# Patient Record
Sex: FEMALE | Race: WHITE | NOT HISPANIC OR LATINO | Employment: OTHER | URBAN - METROPOLITAN AREA
[De-identification: names, ages, dates, MRNs, and addresses within clinical notes are randomized per-mention and may not be internally consistent; named-entity substitution may affect disease eponyms.]

---

## 2017-06-29 RX ORDER — LISINOPRIL 10 MG/1
10 TABLET ORAL AT BEDTIME
COMMUNITY
End: 2024-06-19

## 2017-06-29 RX ORDER — SOLIFENACIN SUCCINATE 10 MG/1
5-10 TABLET, FILM COATED ORAL DAILY PRN
Status: ON HOLD | COMMUNITY
End: 2024-06-24

## 2017-06-29 RX ORDER — METFORMIN HCL 500 MG
500 TABLET, EXTENDED RELEASE 24 HR ORAL 2 TIMES DAILY
COMMUNITY
End: 2024-06-19

## 2017-06-30 ENCOUNTER — APPOINTMENT (OUTPATIENT)
Dept: GENERAL RADIOLOGY | Facility: CLINIC | Age: 77
End: 2017-06-30
Attending: UROLOGY
Payer: MEDICARE

## 2017-06-30 ENCOUNTER — ANESTHESIA EVENT (OUTPATIENT)
Dept: SURGERY | Facility: CLINIC | Age: 77
End: 2017-06-30
Payer: MEDICARE

## 2017-06-30 ENCOUNTER — ANESTHESIA (OUTPATIENT)
Dept: SURGERY | Facility: CLINIC | Age: 77
End: 2017-06-30
Payer: MEDICARE

## 2017-06-30 ENCOUNTER — HOSPITAL ENCOUNTER (OUTPATIENT)
Facility: CLINIC | Age: 77
Discharge: HOME | End: 2017-06-30
Attending: UROLOGY | Admitting: UROLOGY
Payer: MEDICARE

## 2017-06-30 VITALS
TEMPERATURE: 96.8 F | BODY MASS INDEX: 23.76 KG/M2 | DIASTOLIC BLOOD PRESSURE: 59 MMHG | SYSTOLIC BLOOD PRESSURE: 138 MMHG | HEIGHT: 64 IN | RESPIRATION RATE: 16 BRPM | WEIGHT: 139.2 LBS | OXYGEN SATURATION: 97 %

## 2017-06-30 DIAGNOSIS — N32.81 OVERACTIVE BLADDER: Primary | ICD-10-CM

## 2017-06-30 LAB — GLUCOSE BLDC GLUCOMTR-MCNC: 92 MG/DL (ref 70–99)

## 2017-06-30 PROCEDURE — 40000065 ZZH STATISTIC EKG NON-CHARGEABLE

## 2017-06-30 PROCEDURE — 25000125 ZZHC RX 250: Performed by: NURSE ANESTHETIST, CERTIFIED REGISTERED

## 2017-06-30 PROCEDURE — 37000009 ZZH ANESTHESIA TECHNICAL FEE, EACH ADDTL 15 MIN: Performed by: UROLOGY

## 2017-06-30 PROCEDURE — 25000128 H RX IP 250 OP 636: Performed by: UROLOGY

## 2017-06-30 PROCEDURE — 40000170 ZZH STATISTIC PRE-PROCEDURE ASSESSMENT II: Performed by: UROLOGY

## 2017-06-30 PROCEDURE — 37000008 ZZH ANESTHESIA TECHNICAL FEE, 1ST 30 MIN: Performed by: UROLOGY

## 2017-06-30 PROCEDURE — 25000128 H RX IP 250 OP 636: Performed by: ANESTHESIOLOGY

## 2017-06-30 PROCEDURE — 25000125 ZZHC RX 250: Performed by: UROLOGY

## 2017-06-30 PROCEDURE — 27210995 ZZH RX 272: Performed by: UROLOGY

## 2017-06-30 PROCEDURE — C1787 PATIENT PROGR, NEUROSTIM: HCPCS | Performed by: UROLOGY

## 2017-06-30 PROCEDURE — 71000027 ZZH RECOVERY PHASE 2 EACH 15 MINS: Performed by: UROLOGY

## 2017-06-30 PROCEDURE — 36000063 ZZH SURGERY LEVEL 4 EA 15 ADDTL MIN: Performed by: UROLOGY

## 2017-06-30 PROCEDURE — 25000128 H RX IP 250 OP 636: Performed by: NURSE ANESTHETIST, CERTIFIED REGISTERED

## 2017-06-30 PROCEDURE — 25000132 ZZH RX MED GY IP 250 OP 250 PS 637: Mod: GY | Performed by: ANESTHESIOLOGY

## 2017-06-30 PROCEDURE — 82962 GLUCOSE BLOOD TEST: CPT

## 2017-06-30 PROCEDURE — 93010 ELECTROCARDIOGRAM REPORT: CPT | Performed by: INTERNAL MEDICINE

## 2017-06-30 PROCEDURE — C1883 ADAPT/EXT, PACING/NEURO LEAD: HCPCS | Performed by: UROLOGY

## 2017-06-30 PROCEDURE — 36000065 ZZH SURGERY LEVEL 4 W FLUORO 1ST 30 MIN: Performed by: UROLOGY

## 2017-06-30 PROCEDURE — A9270 NON-COVERED ITEM OR SERVICE: HCPCS | Mod: GY | Performed by: ANESTHESIOLOGY

## 2017-06-30 PROCEDURE — 93005 ELECTROCARDIOGRAM TRACING: CPT

## 2017-06-30 PROCEDURE — C1778 LEAD, NEUROSTIMULATOR: HCPCS | Performed by: UROLOGY

## 2017-06-30 PROCEDURE — C1767 GENERATOR, NEURO NON-RECHARG: HCPCS | Performed by: UROLOGY

## 2017-06-30 PROCEDURE — 40000277 XR SURGERY CARM FLUORO LESS THAN 5 MIN W STILLS

## 2017-06-30 PROCEDURE — 71000012 ZZH RECOVERY PHASE 1 LEVEL 1 FIRST HR: Performed by: UROLOGY

## 2017-06-30 PROCEDURE — 27210794 ZZH OR GENERAL SUPPLY STERILE: Performed by: UROLOGY

## 2017-06-30 PROCEDURE — S0020 INJECTION, BUPIVICAINE HYDRO: HCPCS | Performed by: UROLOGY

## 2017-06-30 DEVICE — STIMULATOR NEURO INTER-STIM II 3058: Type: IMPLANTABLE DEVICE | Site: BACK | Status: FUNCTIONAL

## 2017-06-30 DEVICE — LEAD INTERSTIM KIT 3889-28: Type: IMPLANTABLE DEVICE | Site: BACK | Status: FUNCTIONAL

## 2017-06-30 RX ORDER — ONDANSETRON 2 MG/ML
4 INJECTION INTRAMUSCULAR; INTRAVENOUS EVERY 30 MIN PRN
Status: DISCONTINUED | OUTPATIENT
Start: 2017-06-30 | End: 2017-06-30 | Stop reason: HOSPADM

## 2017-06-30 RX ORDER — BUPIVACAINE HYDROCHLORIDE 2.5 MG/ML
INJECTION, SOLUTION INFILTRATION; PERINEURAL PRN
Status: DISCONTINUED | OUTPATIENT
Start: 2017-06-30 | End: 2017-06-30 | Stop reason: HOSPADM

## 2017-06-30 RX ORDER — CEFAZOLIN SODIUM 2 G/100ML
2 INJECTION, SOLUTION INTRAVENOUS
Status: COMPLETED | OUTPATIENT
Start: 2017-06-30 | End: 2017-06-30

## 2017-06-30 RX ORDER — FENTANYL CITRATE 50 UG/ML
25-50 INJECTION, SOLUTION INTRAMUSCULAR; INTRAVENOUS
Status: COMPLETED | OUTPATIENT
Start: 2017-06-30 | End: 2017-06-30

## 2017-06-30 RX ORDER — ONDANSETRON 2 MG/ML
INJECTION INTRAMUSCULAR; INTRAVENOUS PRN
Status: DISCONTINUED | OUTPATIENT
Start: 2017-06-30 | End: 2017-06-30

## 2017-06-30 RX ORDER — MEPERIDINE HYDROCHLORIDE 25 MG/ML
12.5 INJECTION INTRAMUSCULAR; INTRAVENOUS; SUBCUTANEOUS
Status: DISCONTINUED | OUTPATIENT
Start: 2017-06-30 | End: 2017-06-30 | Stop reason: HOSPADM

## 2017-06-30 RX ORDER — OXYCODONE AND ACETAMINOPHEN 5; 325 MG/1; MG/1
1 TABLET ORAL EVERY 4 HOURS PRN
Qty: 25 TABLET | Refills: 0 | Status: SHIPPED | OUTPATIENT
Start: 2017-06-30 | End: 2017-06-30

## 2017-06-30 RX ORDER — CEPHALEXIN 500 MG/1
500 CAPSULE ORAL 3 TIMES DAILY
Qty: 12 CAPSULE | Refills: 0 | Status: SHIPPED | OUTPATIENT
Start: 2017-06-30 | End: 2017-07-04

## 2017-06-30 RX ORDER — EPHEDRINE SULFATE 50 MG/ML
INJECTION, SOLUTION INTRAMUSCULAR; INTRAVENOUS; SUBCUTANEOUS PRN
Status: DISCONTINUED | OUTPATIENT
Start: 2017-06-30 | End: 2017-06-30

## 2017-06-30 RX ORDER — NALOXONE HYDROCHLORIDE 0.4 MG/ML
.1-.4 INJECTION, SOLUTION INTRAMUSCULAR; INTRAVENOUS; SUBCUTANEOUS
Status: DISCONTINUED | OUTPATIENT
Start: 2017-06-30 | End: 2017-06-30 | Stop reason: HOSPADM

## 2017-06-30 RX ORDER — ACETAMINOPHEN 325 MG/1
975 TABLET ORAL ONCE
Status: COMPLETED | OUTPATIENT
Start: 2017-06-30 | End: 2017-06-30

## 2017-06-30 RX ORDER — PROPOFOL 10 MG/ML
INJECTION, EMULSION INTRAVENOUS CONTINUOUS PRN
Status: DISCONTINUED | OUTPATIENT
Start: 2017-06-30 | End: 2017-06-30

## 2017-06-30 RX ORDER — ACETAMINOPHEN AND CODEINE PHOSPHATE 300; 30 MG/1; MG/1
1 TABLET ORAL EVERY 4 HOURS PRN
Qty: 25 TABLET | Refills: 0 | Status: ON HOLD | OUTPATIENT
Start: 2017-06-30 | End: 2019-07-22

## 2017-06-30 RX ORDER — DEXAMETHASONE SODIUM PHOSPHATE 4 MG/ML
INJECTION, SOLUTION INTRA-ARTICULAR; INTRALESIONAL; INTRAMUSCULAR; INTRAVENOUS; SOFT TISSUE PRN
Status: DISCONTINUED | OUTPATIENT
Start: 2017-06-30 | End: 2017-06-30

## 2017-06-30 RX ORDER — FENTANYL CITRATE 0.05 MG/ML
25-50 INJECTION, SOLUTION INTRAMUSCULAR; INTRAVENOUS
Status: DISCONTINUED | OUTPATIENT
Start: 2017-06-30 | End: 2017-06-30 | Stop reason: HOSPADM

## 2017-06-30 RX ORDER — ONDANSETRON 4 MG/1
4 TABLET, ORALLY DISINTEGRATING ORAL EVERY 30 MIN PRN
Status: DISCONTINUED | OUTPATIENT
Start: 2017-06-30 | End: 2017-06-30 | Stop reason: HOSPADM

## 2017-06-30 RX ORDER — LIDOCAINE HYDROCHLORIDE 20 MG/ML
INJECTION, SOLUTION INFILTRATION; PERINEURAL PRN
Status: DISCONTINUED | OUTPATIENT
Start: 2017-06-30 | End: 2017-06-30

## 2017-06-30 RX ORDER — SODIUM CHLORIDE, SODIUM LACTATE, POTASSIUM CHLORIDE, CALCIUM CHLORIDE 600; 310; 30; 20 MG/100ML; MG/100ML; MG/100ML; MG/100ML
INJECTION, SOLUTION INTRAVENOUS CONTINUOUS
Status: DISCONTINUED | OUTPATIENT
Start: 2017-06-30 | End: 2017-06-30 | Stop reason: HOSPADM

## 2017-06-30 RX ADMIN — SODIUM CHLORIDE, POTASSIUM CHLORIDE, SODIUM LACTATE AND CALCIUM CHLORIDE: 600; 310; 30; 20 INJECTION, SOLUTION INTRAVENOUS at 15:54

## 2017-06-30 RX ADMIN — FENTANYL CITRATE 25 MCG: 50 INJECTION, SOLUTION INTRAMUSCULAR; INTRAVENOUS at 16:19

## 2017-06-30 RX ADMIN — ACETAMINOPHEN 975 MG: 325 TABLET, FILM COATED ORAL at 14:05

## 2017-06-30 RX ADMIN — FENTANYL CITRATE 25 MCG: 50 INJECTION, SOLUTION INTRAMUSCULAR; INTRAVENOUS at 16:30

## 2017-06-30 RX ADMIN — MIDAZOLAM HYDROCHLORIDE 2 MG: 1 INJECTION, SOLUTION INTRAMUSCULAR; INTRAVENOUS at 15:54

## 2017-06-30 RX ADMIN — LIDOCAINE HYDROCHLORIDE 40 MG: 20 INJECTION, SOLUTION INFILTRATION; PERINEURAL at 15:54

## 2017-06-30 RX ADMIN — Medication 10 MG: at 16:15

## 2017-06-30 RX ADMIN — FENTANYL CITRATE 25 MCG: 50 INJECTION, SOLUTION INTRAMUSCULAR; INTRAVENOUS at 15:54

## 2017-06-30 RX ADMIN — DEXAMETHASONE SODIUM PHOSPHATE 4 MG: 4 INJECTION, SOLUTION INTRA-ARTICULAR; INTRALESIONAL; INTRAMUSCULAR; INTRAVENOUS; SOFT TISSUE at 16:00

## 2017-06-30 RX ADMIN — ONDANSETRON 4 MG: 2 INJECTION INTRAMUSCULAR; INTRAVENOUS at 16:00

## 2017-06-30 RX ADMIN — PROPOFOL 100 MCG/KG/MIN: 10 INJECTION, EMULSION INTRAVENOUS at 15:54

## 2017-06-30 RX ADMIN — CEFAZOLIN SODIUM 2 G: 2 INJECTION, SOLUTION INTRAVENOUS at 16:05

## 2017-06-30 RX ADMIN — Medication 5 MG: at 16:31

## 2017-06-30 RX ADMIN — Medication 5 MG: at 16:25

## 2017-06-30 RX ADMIN — FENTANYL CITRATE 25 MCG: 50 INJECTION, SOLUTION INTRAMUSCULAR; INTRAVENOUS at 16:48

## 2017-06-30 NOTE — ANESTHESIA CARE TRANSFER NOTE
Patient: Sabrina Burrell    Procedure(s):  INTERSTIM STAGE ONE AND TWO PLACEMENT - Wound Class: I-Clean    Diagnosis: HYPERACTIVITY OF BLADDER   Diagnosis Additional Information: No value filed.    Anesthesia Type:   General, LMA     Note:  Airway :Room Air  Patient transferred to:PACU  Comments: VS upon arrival to PACU  135/66, 86, 12, 99% on room air. Report to AMY Jaffe.      Vitals: (Last set prior to Anesthesia Care Transfer)    CRNA VITALS  6/30/2017 1626 - 6/30/2017 1700      6/30/2017             Resp Rate (set): 10                Electronically Signed By: RANULFO Chavis CRNA  June 30, 2017  5:00 PM

## 2017-06-30 NOTE — ANESTHESIA PREPROCEDURE EVALUATION
Anesthesia Evaluation     . Pt has had prior anesthetic.     History of anesthetic complications   - PONV        ROS/MED HX    ENT/Pulmonary:      (-) sleep apnea   Neurologic:       Cardiovascular:     (+) hypertension----. : . . . :. .       METS/Exercise Tolerance:  >4 METS   Hematologic:         Musculoskeletal:         GI/Hepatic:        (-) GERD   Renal/Genitourinary:         Endo:     (+) type I DM, Not using insulin Diabetic complications: neuropathy, thyroid problem hypothyroidism, .      Psychiatric:         Infectious Disease:         Malignancy:         Other:                     Physical Exam  Normal systems: dental    Airway   Mallampati: II  TM distance: >3 FB  Neck ROM: full    Dental     Cardiovascular   Rhythm and rate: regular and normal      Pulmonary    breath sounds clear to auscultation                    Anesthesia Plan      History & Physical Review  History and physical reviewed and following examination; no interval change.    ASA Status:  2 .    NPO Status:  > 8 hours    Plan for MAC with Intravenous induction.   PONV prophylaxis:  Ondansetron (or other 5HT-3)  Propofol drip  Po tylenol in preop  ga backup      Postoperative Care  Postoperative pain management:  IV analgesics.      Consents  Anesthetic plan, risks, benefits and alternatives discussed with:  Patient..                          .

## 2017-06-30 NOTE — IP AVS SNAPSHOT
[place] [address]    [place] [address]    Phone:  [phone]                                       After Visit Summary   6/30/2017    Sabrina Burrell    MRN: [id number]           After Visit Summary Signature Page     I have received my discharge instructions, and my questions have been answered. I have discussed any challenges I see with this plan with the nurse or doctor.    ..........................................................................................................................................  Patient/Patient Representative Signature      ..........................................................................................................................................  Patient Representative Print Name and Relationship to Patient    ..................................................               ................................................  Date                                            Time    ..........................................................................................................................................  Reviewed by Signature/Title    ...................................................              ..............................................  Date                                                            Time

## 2017-06-30 NOTE — BRIEF OP NOTE
[facility] Brief Operative Note    Pre-operative diagnosis: HYPERACTIVITY OF BLADDER    Post-operative diagnosis Same    Procedure: Procedure(s):  INTERSTIM STAGE ONE AND TWO PLACEMENT - Wound Class: I-Clean   Surgeon(s): Surgeon(s) and Role:     * Barrie Oro MD - Primary   Estimated blood loss: 1 mL    Specimens: * No specimens in log *   Findings: Right S3 ad right pocket

## 2017-06-30 NOTE — IP AVS SNAPSHOT
MRN:[id number]                      After Visit Summary   6/30/2017    Sabrina Burrell    MRN: [id number]           Thank you!     Thank you for choosing [facility] for your care. Our goal is always to provide you with excellent care. Hearing back from our patients is one way we can continue to improve our services. Please take a few minutes to complete the written survey that you may receive in the mail after you visit with us. Thank you!        Patient Information     Date Of Birth          1940        About your hospital stay     You were admitted on:  June 30, 2017 You last received care in the:  [facility] PACU    You were discharged on:  June 30, 2017        Reason for your hospital stay       Overactive bladder                  Who to Call     For medical emergencies, please call 911.  For non-urgent questions about your medical care, please call your primary care provider or clinic, [phone]  For questions related to your surgery, please call your surgery clinic        Attending Provider     Provider Barrie Eaton MD Urology       Primary Care Provider Office Phone # Fax #    Aline Austin [phone]      After Care Instructions     Patient care order       Re-start asa in 5 days                  Follow-up Appointments     Follow-up and recommended labs and tests        F/u with dR. Oro in one month and with juan in 2 weeks call [phone] to schedule                  Further instructions from your care team         Same Day Surgery Discharge Instructions for  Sedation and General Anesthesia       It's not unusual to feel dizzy, light-headed or faint for up to 24 hours after surgery or while taking pain medication.  If you have these symptoms: sit for a few minutes before standing and have someone assist you when you get up to walk or use the bathroom.      You should rest and relax for the next 24 hours. We recommend you make  arrangements to have an adult stay with you for at least 24 hours after your discharge.  Avoid hazardous and strenuous activity.      DO NOT DRIVE any vehicle or operate mechanical equipment for 24 hours following the end of your surgery.  Even though you may feel normal, your reactions may be affected by the medication you have received.      Do not drink alcoholic beverages for 24 hours following surgery.       Slowly progress to your regular diet as you feel able. It's not unusual to feel nauseated and/or vomit after receiving anesthesia.  If you develop these symptoms, drink clear liquids (apple juice, ginger ale, broth, 7-up, etc. ) until you feel better.  If your nausea and vomiting persists for 24 hours, please notify your surgeon.        All narcotic pain medications, along with inactivity and anesthesia, can cause constipation. Drinking plenty of liquids and increasing fiber intake will help.      For any questions of a medical nature, call your surgeon.      Do not make important decisions for 24 hours.       If you feel your pain is not well managed with the pain medications prescribed by your surgeon, please contact your surgeon's office to let them know so they can address your concerns.       While you were at the hospital today you were given 975 mg of Tylenol at 2pm. The recommended daily maximum dose is 4000 mg.     InterStim Discharge Instruction        General information:      Your incision may be sore and tender for 2 weeks.     You may shower starting tomorrow     Your incision was closed using a liquid adhesive. It's important not to scratch, rub or pick the film.  Also, do not apply lotions or creams to the area.    Activity:      Reduce normal daily activity for the first week. No excessive bending, twisting or bouncing.     Walking and light exercise is permissible.    No lifting, pulling or pushing objects heavier than 10 lbs. No vacuuming, carrying groceries or laundry baskets for 3-4  "weeks.     No strenuous exercise for 3-4 weeks    No sexual intercourse for 1-2 weeks.        Contact your surgeon for the following:      A temperature above 101 F.     Pain not relieved by medication    Rash    Nausea or vomiting    Swelling, excessive drainage, warmth at the incisional site      Follow up care:      The [employer] representative will instruct you about your InterStim device. Please contact them with any questions.  Your first follow up appointment will be with a nurse in your surgeon's office in 1-2 weeks.  You will then see your surgeon for a follow up in one month.      **If you have questions or concerns about your procedure,   call Dr. Oro at [phone]**      Pending Results     Date and Time Order Name Status Description    2017 1641 XR Surgery ODILIA Fluoro L/T 5 Min w Stills In process     2017 1308 EKG 12-lead, tracing only Preliminary             Admission Information     Date & Time Provider Department Dept. Phone    2017 Barrie Oro MD [facility] PACU [phone]      Your Vitals Were     Blood Pressure Temperature Respirations Height Weight Pulse Oximetry    139/53 96.8  F (36  C) 15 1.626 m (5' 4\") 63.1 kg (139 lb 3.2 oz) 92%    BMI (Body Mass Index)                   23.89 kg/m2           MyChart Information     [facility] lets you send messages to your doctor, view your test results, renew your prescriptions, schedule appointments and more. To sign up, go to [email or web address] . Click on \"Log in\" on the left side of the screen, which will take you to the Welcome page. Then click on \"Sign up Now\" on the right side of the page.     You will be asked to enter the access code listed below, as well as some personal information. Please follow the directions to create your username and password.     Your access code is: [email or web address]  Expires: 2017  5:05 PM     Your access code will  in 90 days. If you need help or a new code, please " call your [facility] clinic or [phone].        Care EveryWhere ID     This is your Care EveryWhere ID. This could be used by other organizations to access your [facility] medical records  [id number]        Equal Access to Services     GERARD JAEGER : Hadii aad ku hadcollinsaditi Rhonda, waaxda luqadaha, qaybta kaalmada adegama, danny villanuevafrandy alexandermita pascualgeri bautista. So [facility] [phone].    ATENCIÓN: Si habla español, tiene a bose disposición servicios gratuitos de asistencia lingüística. Llame al [phone].    We comply with applicable federal civil rights laws and [place] laws. We do not discriminate on the basis of race, color, national origin, age, disability sex, sexual orientation or gender identity.               Review of your medicines      START taking        Dose / Directions    acetaminophen-codeine 300-30 MG per tablet   Commonly known as:  TYLENOL w/CODEINE No. 3   Used for:  Overactive bladder        Dose:  1 tablet   Take 1 tablet by mouth every 4 hours as needed for mild pain   Quantity:  25 tablet   Refills:  0       cephALEXin 500 MG capsule   Commonly known as:  KEFLEX   Used for:  Overactive bladder        Dose:  500 mg   Take 1 capsule (500 mg) by mouth 3 times daily for 12 doses   Quantity:  12 capsule   Refills:  0         CONTINUE these medicines which have NOT CHANGED        Dose / Directions    ALLOPURINOL PO        Dose:  300 mg   Take 300 mg by mouth   Refills:  0       ASPIRIN PO        Dose:  81 mg   Take 81 mg by mouth daily   Refills:  0       cetirizine 10 MG tablet   Commonly known as:  zyrTEC        Dose:  10 mg   Take 10 mg by mouth daily   Refills:  0       Estradiol-Norethindrone Acet 0.5-0.1 MG Tabs        Refills:  0       levothyroxine 100 MCG tablet   Commonly known as:  SYNTHROID/LEVOTHROID        Take by mouth daily   Refills:  0       metFORMIN 500 MG 24 hr tablet   Commonly known as:  GLUCOPHAGE-XR        Dose:  500 mg   Take 500 mg by mouth daily (with dinner)  TAKES 2000 MG BID   Refills:  0       MULTIVITAMINS PO        Refills:  0       OMEGA-3 FISH OIL PO        Refills:  0       oxybutynin chloride 15 MG Tb24        Dose:  15 mg   Take 15 mg by mouth daily   Refills:  0       PROBIOTIC & ACIDOPHILUS EX ST PO        Refills:  0       triamcinolone 0.1 % cream   Commonly known as:  KENALOG        Apply topically 2 times daily   Refills:  0       VESICARE PO        Dose:  10 mg   Take 10 mg by mouth   Refills:  0       VITAMIN D3 PO        Dose:  2000 Units   Take 2,000 Units by mouth daily   Refills:  0       ZESTRIL PO        Dose:  10 mg   Take 10 mg by mouth daily   Refills:  0       ZOCOR PO        Dose:  10 mg   Take 10 mg by mouth   Refills:  0            Where to get your medicines      These medications were sent to [facility] - [address]  [address], [place] [address]     Phone:  [phone]     cephALEXin 500 MG capsule         Some of these will need a paper prescription and others can be bought over the counter. Ask your nurse if you have questions.     Bring a paper prescription for each of these medications     acetaminophen-codeine 300-30 MG per tablet                Protect others around you: Learn how to safely use, store and throw away your medicines at www.disposemymeds.org.             Medication List: This is a list of all your medications and when to take them. Check marks below indicate your daily home schedule. Keep this list as a reference.      Medications           Morning Afternoon Evening Bedtime As Needed    acetaminophen-codeine 300-30 MG per tablet   Commonly known as:  TYLENOL w/CODEINE No. 3   Take 1 tablet by mouth every 4 hours as needed for mild pain                                ALLOPURINOL PO   Take 300 mg by mouth                                ASPIRIN PO   Take 81 mg by mouth daily                                cephALEXin 500 MG capsule   Commonly known as:  KEFLEX   Take 1  capsule (500 mg) by mouth 3 times daily for 12 doses                                cetirizine 10 MG tablet   Commonly known as:  zyrTEC   Take 10 mg by mouth daily                                Estradiol-Norethindrone Acet 0.5-0.1 MG Tabs                                levothyroxine 100 MCG tablet   Commonly known as:  SYNTHROID/LEVOTHROID   Take by mouth daily                                metFORMIN 500 MG 24 hr tablet   Commonly known as:  GLUCOPHAGE-XR   Take 500 mg by mouth daily (with dinner) TAKES 2000 MG BID                                MULTIVITAMINS PO                                OMEGA-3 FISH OIL PO                                oxybutynin chloride 15 MG Tb24   Take 15 mg by mouth daily                                PROBIOTIC & ACIDOPHILUS EX ST PO                                triamcinolone 0.1 % cream   Commonly known as:  KENALOG   Apply topically 2 times daily                                VESICARE PO   Take 10 mg by mouth                                VITAMIN D3 PO   Take 2,000 Units by mouth daily                                ZESTRIL PO   Take 10 mg by mouth daily                                ZOCOR PO   Take 10 mg by mouth

## 2017-06-30 NOTE — DISCHARGE INSTRUCTIONS
Same Day Surgery Discharge Instructions for  Sedation and General Anesthesia       It's not unusual to feel dizzy, light-headed or faint for up to 24 hours after surgery or while taking pain medication.  If you have these symptoms: sit for a few minutes before standing and have someone assist you when you get up to walk or use the bathroom.      You should rest and relax for the next 24 hours. We recommend you make arrangements to have an adult stay with you for at least 24 hours after your discharge.  Avoid hazardous and strenuous activity.      DO NOT DRIVE any vehicle or operate mechanical equipment for 24 hours following the end of your surgery.  Even though you may feel normal, your reactions may be affected by the medication you have received.      Do not drink alcoholic beverages for 24 hours following surgery.       Slowly progress to your regular diet as you feel able. It's not unusual to feel nauseated and/or vomit after receiving anesthesia.  If you develop these symptoms, drink clear liquids (apple juice, ginger ale, broth, 7-up, etc. ) until you feel better.  If your nausea and vomiting persists for 24 hours, please notify your surgeon.        All narcotic pain medications, along with inactivity and anesthesia, can cause constipation. Drinking plenty of liquids and increasing fiber intake will help.      For any questions of a medical nature, call your surgeon.      Do not make important decisions for 24 hours.       If you feel your pain is not well managed with the pain medications prescribed by your surgeon, please contact your surgeon's office to let them know so they can address your concerns.       While you were at the hospital today you were given 975 mg of Tylenol at 2pm. The recommended daily maximum dose is 4000 mg.     InterStim Discharge Instruction        General information:      Your incision may be sore and tender for 2 weeks.     You may shower starting tomorrow     Your incision was  closed using a liquid adhesive. It's important not to scratch, rub or pick the film.  Also, do not apply lotions or creams to the area.    Activity:      Reduce normal daily activity for the first week. No excessive bending, twisting or bouncing.     Walking and light exercise is permissible.    No lifting, pulling or pushing objects heavier than 10 lbs. No vacuuming, carrying groceries or laundry baskets for 3-4 weeks.     No strenuous exercise for 3-4 weeks    No sexual intercourse for 1-2 weeks.        Contact your surgeon for the following:      A temperature above 101 F.     Pain not relieved by medication    Rash    Nausea or vomiting    Swelling, excessive drainage, warmth at the incisional site      Follow up care:      The [employer] representative will instruct you about your InterStim device. Please contact them with any questions.  Your first follow up appointment will be with a nurse in your surgeon's office in 1-2 weeks.  You will then see your surgeon for a follow up in one month.      **If you have questions or concerns about your procedure,   call Dr. Oro at [phone]**

## 2017-07-01 NOTE — ANESTHESIA POSTPROCEDURE EVALUATION
Patient: Sabrina Burrell    Procedure(s):  INTERSTIM STAGE ONE AND TWO PLACEMENT - Wound Class: I-Clean    Diagnosis:HYPERACTIVITY OF BLADDER   Diagnosis Additional Information: No value filed.    Anesthesia Type:  General, LMA    Note:  Anesthesia Post Evaluation    Patient location during evaluation: PACU  Patient participation: Able to fully participate in evaluation  Level of consciousness: awake  Pain management: adequate  Cardiovascular status: acceptable  Respiratory status: acceptable  Hydration status: acceptable  PONV: none     Anesthetic complications: None          Last vitals:  Vitals:    06/30/17 1715 06/30/17 1730 06/30/17 1820   BP: 136/60 139/53 138/59   Resp: 18 15 16   Temp: 35.8  C (96.4  F) 36  C (96.8  F)    SpO2: 97% 92% 97%         Electronically Signed By: JADEN YI  June 30, 2017  9:05 PM

## 2017-07-04 LAB — INTERPRETATION ECG - MUSE: NORMAL

## 2017-07-07 NOTE — OP NOTE
Surgeon / Clinician: Barrie Oro MD    PREOPERATIVE DIAGNOSIS:  Overactive bladder.    POSTOPERATIVE DIAGNOSIS:  Overactive bladder.    PROCEDURE: InterStim stage I, stage II placement.    SURGEON:  Barrie Oro MD    ANESTHESIA:  Sedation and local.    Preoperatively she received antibiotics.    ESTIMATED BLOOD LOSS:  Minimal.    INDICATIONS FOR PROCEDURE:  Sabrina Burrell is a 76-year-old female who underwent percutaneous InterStim placement test in the office and she did quite well with the bother score improved by 90% with improved urgency and frequency.  I consented her for stage I and stage II placement with the risks of bleeding, infection into the surrounding organs, inability to have MRI, need to exchange battery every 3-5 years, persistence of the overactive bladder symptoms and she would like to proceed.    PROCEDURE:  Sabrina was brought to the operating room and placed in supine position after excellent induction of sedation.  Her back areas were prepped and draped in the regular fashion right S3 foramen was nicely cannulated using a percutaneous needle.  I achieved excellent motor responses both vellus and toe flexion.  Given excellent response with very small current, I decided to place permanent 4 electrodes which were introduced under fluoroscopic guidance and appeared in excellent position over right S3 foramen.  Once the leads with 4 electrodes were positioned, I connected subcutaneously to the right mid pocket over the buttock cheek area.  A 4 cm incision was made to make the pocket over the skin and subcutaneous tissue was dissected.  I connected the battery to the lead and placed the battery into the subcutaneous pocket.  The pocket was closed using 2-0 Vicryl in a running fashion.  Subcutaneous incision was closed using 2-0 Vicryl in the running fashion also .  All incisional sides were closed using Monocryl and Dermabond to the skin.  The patient       was wakened up and  transferred to the stretcher and recovery room in stable condition.      The plan as of now would be to see our nurse back in 1-2 week interval and see me back in 1 month interval.        Barrie Oro MD    D:  07/05/2017 17:23 T:  07/07/2017 01:38  Document:  [id number] LS\GM\mk

## 2017-11-16 ENCOUNTER — THERAPY VISIT (OUTPATIENT)
Dept: PHYSICAL THERAPY | Facility: CLINIC | Age: 77
End: 2017-11-16
Payer: MEDICARE

## 2017-11-16 DIAGNOSIS — M54.41 ACUTE RIGHT-SIDED LOW BACK PAIN WITH RIGHT-SIDED SCIATICA: Primary | ICD-10-CM

## 2017-11-16 PROCEDURE — 97112 NEUROMUSCULAR REEDUCATION: CPT | Mod: GP | Performed by: PHYSICAL THERAPIST

## 2017-11-16 PROCEDURE — G8982 BODY POS GOAL STATUS: HCPCS | Mod: GP | Performed by: PHYSICAL THERAPIST

## 2017-11-16 PROCEDURE — G8981 BODY POS CURRENT STATUS: HCPCS | Mod: GP | Performed by: PHYSICAL THERAPIST

## 2017-11-16 PROCEDURE — 97161 PT EVAL LOW COMPLEX 20 MIN: CPT | Mod: GP | Performed by: PHYSICAL THERAPIST

## 2017-11-16 PROCEDURE — 97110 THERAPEUTIC EXERCISES: CPT | Mod: GP | Performed by: PHYSICAL THERAPIST

## 2017-11-16 NOTE — PROGRESS NOTES
[facility] Initial Evaluation -- Lumbar    Date: November 16, 2017  Sabrina Burrell is a 76 year old female with a Back and Rt leg condition.   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     Symptoms (improving/unchanging/worsening):  improving.     Symptoms commenced as a result of: possibly d/t prolonged sitting at a dinner in a [place]   Condition occurred in the following environment:   Community??     Symptoms at onset (back/thigh/leg): Sharp pain post Rt leg and knee  Constant symptoms (back/thigh/leg): Post Rt thigh, post lat knee and calf  Intermittent symptoms (back/thigh/leg):     Symptoms are made worse with the following: Sometimes Bending, Always Sitting, Sometimes Rising, Always Standing, Always Lying, Time of day - Always as the day progresses and Always When still   Symptoms are made better with the following: Always On the move and Other - advil    Disturbed sleep (yes/no):  Difficulty falling asleep and was waking during the night Sleeping postures (prone/sup/side R/L): side R/L    Previous episodes (0/1-5/6-10/11+): 7 yr Hx of LBP Year of first episode:     Previous history: nagging aching in LB  Previous treatments: Chiro 1 x/week for LB - temp relief of leg pain      Specific Questions:  Cough/Sneeze/Strain (pos/neg): neg  Bowel/Bladder (normal/abnormal): normal  Gait (normal/abnormal): normal  Medications  (nil/NSAIDS/analg/steroids/anticoag/other):  NSAIDS and Other - Hormone replacement, High blood pressure and Diabetes and thyroid  Medical allergies:  NKA  General health (excellent/good/fair/poor):  good  Pertinent medical history:  Diabetes, Implanted device and Thyroid problems  Imaging (None/Xray/MRI/Other):  X-ray - Arthrtis  Recent or major surgery (yes/no):  Knee replacement Lt,  Night pain (yes/no): no  Accidents (yes/no): no  Unexplained weight loss (yes/no): no  Barriers at home: none  Other red flags: N&T, Pain at night rest    EXAMINATION    Posture:   Sitting (good/fair/poor): fair  Standing (good/fair/poor):fair  Lordosis (red/acc/normal): red  Correction of posture (better/worse/no effect): better    Lateral Shift (right/left/nil): Right  Relevant (yes/no):  ??? Did have some before pain started  Other Observations:     Neurological:    Motor deficit:  Mod Difficulty toe walk, min difficulty heel walk, unable to single leg squat Rt, Decr hip flexion  Reflexes:    Sensory deficit:    Dural signs:  (++) Slump    Movement Loss:   Jose Mod Min Nil Pain   Flexion  X   Rt leg pain   Extension  X      Side Gliding R   X  LBP   Side Gliding L   X       Test Movements:   During: produces, abolishes, increases, decreases, no effect, centralizing, peripheralizing   After: better, worse, no better, no worse, no effect, centralized, peripheralized    Pretest symptoms standing: pain Rt post leg/knee to calf   Symptoms During Symptoms After ROM increased ROM decreased No Effect   FIS        Rep FIS        EIS Decreases    No Better         Rep EIS Decreases    Better    X - incr strength         Static Tests:  Sitting slouched:  Incr  Sitting erect:  decr  Standing slouched na  Standing erect:  na  Lying prone in extension:  na Long sitting:  na    Other Tests:     Provisional Classification:  Derangement - Asymmetrical, unilateral, symptoms below knee    Principle of Management:  Education:  Posture:  Neutral spine,  use of L-roll, Affect of posture on LB and pain.Centralisation vs peripheralisation,    Equipment provided:  none  Mechanical therapy (Y/N):  y   Extension principle:  EIStdg hips against table or elbows on wall 10x 6 x/day.  Lateral Principle:    Flexion principle:    Other:      ASSESSMENT/PLAN:    Patient is a 76 year old female with lumbar complaints.    Patient has the following significant findings with corresponding treatment plan.                Diagnosis 1:  LBP w/ sciatica Rt  Pain -  manual therapy, education, directional preference exercise and home program  Decreased ROM/flexibility - manual therapy, therapeutic exercise and home program  Decreased strength - therapeutic exercise, therapeutic activities and home program  Decreased function - therapeutic activities and home program  Impaired posture - neuro re-education and home program    Therapy Evaluation Codes:   1) History comprised of:   Personal factors that impact the plan of care:      None.    Comorbidity factors that impact the plan of care are:      Diabetes and Implanted device.     Medications impacting care: Anti-inflammatory, High blood pressure and hormone replacement, Thyroid.  2) Examination of Body Systems comprised of:   Body structures and functions that impact the plan of care:      Lumbar spine.   Activity limitations that impact the plan of care are:      Bending, Dressing, Sitting and Standing.  3) Clinical presentation characteristics are:   Stable/Uncomplicated.  4) Decision-Making    Low complexity using standardized patient assessment instrument and/or measureable assessment of functional outcome.  Cumulative Therapy Evaluation is: Low complexity.    Previous and current functional limitations:  (See Goal Flow Sheet for this information)    Short term and Long term goals: (See Goal Flow Sheet for this information)     Communication ability:  Patient appears to be able to clearly communicate and understand verbal and written  communication and follow directions correctly.  Treatment Explanation - The following has been discussed with the patient:   RX ordered/plan of care  Anticipated outcomes  Possible risks and side effects  This patient would benefit from PT intervention to resume normal activities.   Rehab potential is good.    Frequency:  1 X week, once daily  Duration:  for 8 weeks  Discharge Plan:  Achieve all LTG.  Independent in home treatment program.  Reach maximal therapeutic benefit.    Please refer to the daily flowsheet for treatment today, total treatment time and time spent performing 1:1 timed codes.

## 2017-11-16 NOTE — LETTER
DEPARTMENT OF HEALTH AND HUMAN SERVICES  CENTERS FOR MEDICARE & MEDICAID SERVICES    PLAN/UPDATED PLAN OF PROGRESS FOR OUTPATIENT REHABILITATION    PATIENTS NAME:  Sabrina Burrell   : 1940    PROVIDER NUMBER:    [id number]  [facility]  [id number]     PROVIDER NAME: [facility]  MEDICAL RECORD NUMBER: [id number]     START OF CARE DATE:  SOC Date: 17   TYPE:  PT    PRIMARY/TREATMENT DIAGNOSIS: (Pertinent Medical Diagnosis)  Acute right-sided low back pain with right-sided sciatica    VISITS FROM START OF CARE:  1     [facility] Initial Evaluation -- Lumbar  Date: 2017  Sabrina Burrell is a 76 year old female with a Back and Rt leg condition.   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     Symptoms (improving/unchanging/worsening):  improving.   Symptoms commenced as a result of: possibly d/t prolonged sitting at a dinner in a [place]   Condition occurred in the following environment:   Community??   Symptoms at onset (back/thigh/leg): Sharp pain post Rt leg and knee  Constant symptoms (back/thigh/leg): Post Rt thigh, post lat knee and calf  Intermittent symptoms (back/thigh/leg):   Symptoms are made worse with the following: Sometimes Bending, Always Sitting, Sometimes Rising, Always Standing, Always Lying, Time of day - Always as the day progresses and Always When still   Symptoms  are made better with the following: Always On the move and Other - advil  Disturbed sleep (yes/no):  Difficulty falling asleep and was waking during the night  Sleeping postures (prone/sup/side R/L): side R/L  Previous episodes (0/1-5/6-10/11+): 7 yr Hx of LBP   Year of first episode:   Previous history: nagging aching in LB  Previous treatments: Chiro 1 x/week for LB - temp relief of leg pain  PATIENTS NAME:  Sabrina Burrell   : 1940  PRIMARY/TREATMENT DIAGNOSIS: (Pertinent Medical Diagnosis)  Acute right-sided low back pain with right-sided sciatica    Specific Questions:  Cough/Sneeze/Strain (pos/neg): neg  Bowel/Bladder (normal/abnormal): normal  Gait (normal/abnormal): normal  Medications (nil/NSAIDS/analg/steroids/anticoag/other):  NSAIDS and Other - Hormone replacement, High blood pressure and Diabetes and thyroid  Medical allergies:  NKA  General health (excellent/good/fair/poor):  good  Pertinent medical history:  Diabetes, Implanted device and Thyroid problems  Imaging (None/Xray/MRI/Other):  X-ray - Arthrtis  Recent or major surgery (yes/no):  Knee replacement Lt,  Night pain (yes/no): no  Accidents (yes/no): no  Unexplained weight loss (yes/no): no  Barriers at home: none  Other red flags: N&T, Pain at night rest    EXAMINATION    Posture:   Sitting (good/fair/poor): fair  Standing (good/fair/poor):fair  Lordosis (red/acc/normal): red  Correction of posture (better/worse/no effect): better  Lateral Shift (right/left/nil): Right  Relevant (yes/no):  ??? Did have some before pain started  Other Observations:     Neurological:  Motor deficit:  Mod Difficulty toe walk, min difficulty heel walk, unable to single leg squat Rt, Decr hip flexion    Reflexes:    Sensory deficit:      Dural signs:  (++) Slump    Movement Loss:   Jose Mod Min Nil Pain   Flexion  X   Rt leg pain   Extension  X      Side Gliding R   X  LBP   Side Gliding L   X       Test Movements:   During: produces, abolishes, increases,  decreases, no effect, centralizing, peripheralizing   After: better, worse, no better, no worse, no effect, centralized, peripheralized        PATIENTS NAME:  Sabrina Burrell   : 1940  PRIMARY/TREATMENT DIAGNOSIS: (Pertinent Medical Diagnosis)  Acute right-sided low back pain with right-sided sciatica    Pretest symptoms standing: pain Rt post leg/knee to calf   Symptoms During Symptoms After ROM increased ROM decreased No Effect   FIS        Rep FIS        EIS Decreases    No Better         Rep EIS Decreases    Better    X - incr strength     Static Tests:  Sitting slouched:  Incr  Sitting erect:  decr  Standing slouched na  Standing erect:  na  Lying prone in extension:  na Long sitting:  na  Other Tests:   Provisional Classification:  Derangement - Asymmetrical, unilateral, symptoms below knee    Principle of Management:  Education:  Posture:  Neutral spine, use of L-roll, Affect of posture on LB and pain.Centralisation vs peripheralisation,      Equipment provided:  none  Mechanical therapy (Y/N):  y   Extension principle:  EIStdg hips against table or elbows on wall 10x 6 x/day.  Lateral Principle:    Flexion principle:      Other:      ASSESSMENT/PLAN:  Patient is a 76 year old female with lumbar complaints.    Patient has the following significant findings with corresponding treatment plan.                Diagnosis 1:  LBP w/ sciatica Rt  Pain -  manual therapy, education, directional preference exercise and home program  Decreased ROM/flexibility - manual therapy, therapeutic exercise and home program  Decreased strength - therapeutic exercise, therapeutic activities and home program  Decreased function - therapeutic activities and home program  Impaired posture - neuro re-education and home program                              PATIENTS NAME:  Sabrina Burrell   : 1940  PRIMARY/TREATMENT DIAGNOSIS: (Pertinent Medical Diagnosis)  Acute right-sided low back pain with right-sided  sciatica    Therapy Evaluation Codes:   1) History comprised of:   Personal factors that impact the plan of care:      None.    Comorbidity factors that impact the plan of care are:      Diabetes and Implanted device.     Medications impacting care: Anti-inflammatory, High blood pressure and hormone replacement, Thyroid.  2) Examination of Body Systems comprised of:   Body structures and functions that impact the plan of care:      Lumbar spine.   Activity limitations that impact the plan of care are:      Bending, Dressing, Sitting and Standing.  3) Clinical presentation characteristics are:   Stable/Uncomplicated.  4) Decision-Making    Low complexity using standardized patient assessment instrument and/or measureable assessment of functional outcome.  Cumulative Therapy Evaluation is: Low complexity.    Previous and current functional limitations:  (See Goal Flow Sheet for this information)    Short term and Long term goals: (See Goal Flow Sheet for this information)   Communication ability:  Patient appears to be able to clearly communicate and understand verbal and written communication and follow directions correctly.  Treatment Explanation - The following has been discussed with the patient:   RX ordered/plan of care, Anticipated outcomes, Possible risks and side effects                                            PATIENTS NAME:  Sabrina Burrell   : 1940  PRIMARY/TREATMENT DIAGNOSIS: (Pertinent Medical Diagnosis)  Acute right-sided low back pain with right-sided sciatica      This patient would benefit from PT intervention to resume normal activities.   Rehab potential is good.  Frequency:  1 X week, once daily  Duration:  for 8 weeks  Discharge Plan:  Achieve all LTG.  Independent in home treatment program.  Reach maximal therapeutic benefit.        Caregiver Signature/Credentials _____________________________ Date ________         Julia Moran PT cert MDT     I have reviewed and certified the need  "for these services and plan of treatment while under my care.        PHYSICIAN'S SIGNATURE:   _________________________________________     Date___________   Aline Austin MD    Certification period:  Beginning of Cert date period: 11/16/17 to   02/13/18     Functional Level Progress Report: Please see attached \"Goal Flow sheet for Functional level.\"    ____X____ Continue Services or       ________ DC Services                Service dates: From  SOC Date: 11/16/17  to present                         "

## 2017-11-30 ENCOUNTER — THERAPY VISIT (OUTPATIENT)
Dept: PHYSICAL THERAPY | Facility: CLINIC | Age: 77
End: 2017-11-30
Payer: MEDICARE

## 2017-11-30 DIAGNOSIS — M54.41 ACUTE RIGHT-SIDED LOW BACK PAIN WITH RIGHT-SIDED SCIATICA: ICD-10-CM

## 2017-11-30 PROCEDURE — 97110 THERAPEUTIC EXERCISES: CPT | Mod: GP | Performed by: PHYSICAL THERAPIST

## 2017-11-30 PROCEDURE — 97530 THERAPEUTIC ACTIVITIES: CPT | Mod: GP | Performed by: PHYSICAL THERAPIST

## 2017-12-06 ENCOUNTER — THERAPY VISIT (OUTPATIENT)
Dept: PHYSICAL THERAPY | Facility: CLINIC | Age: 77
End: 2017-12-06
Payer: MEDICARE

## 2017-12-06 DIAGNOSIS — M54.41 ACUTE RIGHT-SIDED LOW BACK PAIN WITH RIGHT-SIDED SCIATICA: ICD-10-CM

## 2017-12-06 PROCEDURE — 97110 THERAPEUTIC EXERCISES: CPT | Mod: GP | Performed by: PHYSICAL THERAPIST

## 2017-12-06 PROCEDURE — 97530 THERAPEUTIC ACTIVITIES: CPT | Mod: GP | Performed by: PHYSICAL THERAPIST

## 2017-12-13 ENCOUNTER — THERAPY VISIT (OUTPATIENT)
Dept: PHYSICAL THERAPY | Facility: CLINIC | Age: 77
End: 2017-12-13
Payer: MEDICARE

## 2017-12-13 DIAGNOSIS — M54.41 ACUTE RIGHT-SIDED LOW BACK PAIN WITH RIGHT-SIDED SCIATICA: ICD-10-CM

## 2017-12-13 PROCEDURE — 97110 THERAPEUTIC EXERCISES: CPT | Mod: GP | Performed by: PHYSICAL THERAPIST

## 2017-12-13 PROCEDURE — 97530 THERAPEUTIC ACTIVITIES: CPT | Mod: GP | Performed by: PHYSICAL THERAPIST

## 2017-12-20 ENCOUNTER — THERAPY VISIT (OUTPATIENT)
Dept: PHYSICAL THERAPY | Facility: CLINIC | Age: 77
End: 2017-12-20
Payer: MEDICARE

## 2017-12-20 DIAGNOSIS — M54.41 ACUTE RIGHT-SIDED LOW BACK PAIN WITH RIGHT-SIDED SCIATICA: ICD-10-CM

## 2017-12-20 PROCEDURE — 97530 THERAPEUTIC ACTIVITIES: CPT | Mod: GP | Performed by: PHYSICAL THERAPIST

## 2017-12-20 PROCEDURE — 97110 THERAPEUTIC EXERCISES: CPT | Mod: GP | Performed by: PHYSICAL THERAPIST

## 2018-01-02 ENCOUNTER — THERAPY VISIT (OUTPATIENT)
Dept: PHYSICAL THERAPY | Facility: CLINIC | Age: 78
End: 2018-01-02
Payer: MEDICARE

## 2018-01-02 DIAGNOSIS — M54.41 ACUTE RIGHT-SIDED LOW BACK PAIN WITH RIGHT-SIDED SCIATICA: ICD-10-CM

## 2018-01-02 PROCEDURE — 97530 THERAPEUTIC ACTIVITIES: CPT | Mod: GP | Performed by: PHYSICAL THERAPIST

## 2018-01-02 PROCEDURE — 97110 THERAPEUTIC EXERCISES: CPT | Mod: GP | Performed by: PHYSICAL THERAPIST

## 2018-01-16 ENCOUNTER — THERAPY VISIT (OUTPATIENT)
Dept: PHYSICAL THERAPY | Facility: CLINIC | Age: 78
End: 2018-01-16
Payer: MEDICARE

## 2018-01-16 DIAGNOSIS — M54.41 ACUTE RIGHT-SIDED LOW BACK PAIN WITH RIGHT-SIDED SCIATICA: ICD-10-CM

## 2018-01-16 PROCEDURE — G8982 BODY POS GOAL STATUS: HCPCS | Mod: GP | Performed by: PHYSICAL THERAPIST

## 2018-01-16 PROCEDURE — 97530 THERAPEUTIC ACTIVITIES: CPT | Mod: GP | Performed by: PHYSICAL THERAPIST

## 2018-01-16 PROCEDURE — G8983 BODY POS D/C STATUS: HCPCS | Mod: GP | Performed by: PHYSICAL THERAPIST

## 2018-01-16 PROCEDURE — 97110 THERAPEUTIC EXERCISES: CPT | Mod: GP | Performed by: PHYSICAL THERAPIST

## 2018-01-16 NOTE — LETTER
[facility]  [address]  [facility] [address]  [phone]    2018    Re: Sabrina Burrell   :   1940  MRN:  [id number]   REFERRING PHYSICIAN:   Aline Austin    [facility]    Date of Initial Evaluation: 2017  Visits:   7  Reason for Referral:  Acute right-sided low back pain with right-sided sciatica    DISCHARGE REPORT: Progress reporting period is from 2017 to 2018.       SUBJECTIVE  Subjective changes noted by patient:  Has not been having any pain.  Did have a day waking very stiff after sleeping w/ a new mattress topper.    Current Pain level: 0/10.     Initial Pain level: 3/10 (ranges 2-9/10).   Changes in function:  Yes (See Goal flowsheet attached for changes in current functional level)  Adverse reaction to treatment or activity: None    OBJECTIVE  Changes noted in objective findings:  Yes, incr ROM and Rt leg strength.  Sitting posture is fair.    Trunk AROM is WNL except extension 25% loss.    Rt L/E strength is 5-/5 of 5 throughout except HS is 4+/5.     ASSESSMENT/PLAN  Updated problem list and treatment plan: Diagnosis 1:  LBP w/ Rt sciatica  Decreased strength - home program  Impaired posture - home program  STG/LTGs have been met or progress has been made towards goals:  Yes (See Goal flow sheet completed today.)  Assessment of Progress: The patient has met all of their long term goals.  Self Management Plans:  Patient is independent in a home treatment program.  Sabrina continues to require the following intervention to meet STG and LTG's:  PT intervention is no longer required to meet STG/LTG.                    Re: Sabrina Burrell   :   1940    Recommendations:  This patient is ready to be discharged from therapy and continue their home treatment program.    Thank you for your referral.    INQUIRIES        Therapist:  Julia Moran, PT,  cert MDT  [facility]  [address] [facility] [place] [address]  [place] [address]  Phone: [phone]  Fax: [phone]

## 2018-01-16 NOTE — PROGRESS NOTES
Subjective:  HPI                    Objective:  System    Physical Exam    General     ROS    Assessment/Plan:    DISCHARGE REPORT    Progress reporting period is from 11- to 1-.       SUBJECTIVE  Subjective changes noted by patient:  Has not been having any pain.  Did have a day waking very stiff after sleeping w/ a new mattress topper.    Current Pain level: 0/10.     Initial Pain level: 3/10 (ranges 2-9/10).   Changes in function:  Yes (See Goal flowsheet attached for changes in current functional level)  Adverse reaction to treatment or activity: None    OBJECTIVE  Changes noted in objective findings:  Yes, incr ROM and Rt leg strength.  Sitting posture is fair.    Trunk AROM is WNL except extension 25% loss.    Rt L/E strength is 5-/5 of 5 throughout except HS is 4+/5.     ASSESSMENT/PLAN  Updated problem list and treatment plan: Diagnosis 1:  LBP w/ Rt sciatica  Decreased strength - home program  Impaired posture - home program  STG/LTGs have been met or progress has been made towards goals:  Yes (See Goal flow sheet completed today.)  Assessment of Progress: The patient has met all of their long term goals.  Self Management Plans:  Patient is independent in a home treatment program.    Sabrina continues to require the following intervention to meet STG and LTG's:  PT intervention is no longer required to meet STG/LTG.    Recommendations:  This patient is ready to be discharged from therapy and continue their home treatment program.    Please refer to the daily flowsheet for treatment today, total treatment time and time spent performing 1:1 timed codes.

## 2018-07-19 ENCOUNTER — THERAPY VISIT (OUTPATIENT)
Dept: PHYSICAL THERAPY | Facility: CLINIC | Age: 78
End: 2018-07-19
Payer: MEDICARE

## 2018-07-19 DIAGNOSIS — M54.41 RIGHT-SIDED LOW BACK PAIN WITH RIGHT-SIDED SCIATICA: ICD-10-CM

## 2018-07-19 PROCEDURE — 97530 THERAPEUTIC ACTIVITIES: CPT | Mod: GP | Performed by: PHYSICAL THERAPIST

## 2018-07-19 PROCEDURE — G8979 MOBILITY GOAL STATUS: HCPCS | Mod: GP | Performed by: PHYSICAL THERAPIST

## 2018-07-19 PROCEDURE — 97162 PT EVAL MOD COMPLEX 30 MIN: CPT | Mod: GP | Performed by: PHYSICAL THERAPIST

## 2018-07-19 PROCEDURE — G8978 MOBILITY CURRENT STATUS: HCPCS | Mod: GP | Performed by: PHYSICAL THERAPIST

## 2018-07-19 PROCEDURE — 97110 THERAPEUTIC EXERCISES: CPT | Mod: GP | Performed by: PHYSICAL THERAPIST

## 2018-07-19 NOTE — LETTER
DEPARTMENT OF HEALTH AND HUMAN SERVICES  CENTERS FOR MEDICARE & MEDICAID SERVICES    PLAN/UPDATED PLAN OF PROGRESS FOR OUTPATIENT REHABILITATION    PATIENTS NAME:  Sabrina Burrell   : 1940  PROVIDER NUMBER:    [id number]  [id number]  PROVIDER NAME: [facility]  MEDICAL RECORD NUMBER: [id number]   START OF CARE DATE:  SOC Date: 18   TYPE:  PT  PRIMARY/TREATMENT DIAGNOSIS: (Pertinent Medical Diagnosis)  Right-sided low back pain with right-sided sciatica  VISITS FROM START OF CARE:  1  Rxs Used: 1     [employer] for [facility] Initial Evaluation -- Lumbar    Date: 2018  Sabrina Burrell is a 77 year old female with a lumbar spine condition.   Referral: GP  Work mechanical stresses:  NA  Employment status:  Retired  Leisure mechanical stresses: [employer] 2 x week pool; bike 40 min daily  Functional disability score (SEKOU/STarT Back):  See SEKOU/STarT Back flowsheets  VAS score (0-10): 5/10  Patient goals/expectations:  Trip planned to [employer] in November, so wants back is better shape by then.    HISTORY:  Present symptoms: (B) low back R > L, Rt glut to knee, Rt Groin  Pain quality (sharp/shooting/stabbing/aching/burning/cramping):  dull/sharp   Paresthesia (yes/no):  Yes, neuropathy (B) feet for years  Present since (onset date): 3 weeks.  MD referral date 18    Symptoms (improving/unchanging/worsening):  improving.   Symptoms commenced as a result of: No apparent reason   Condition occurred in the following environment:   Home   Symptoms at onset (back/thigh/leg): As above  Constant symptoms (back/thigh/leg): None  Intermittent symptoms (back/thigh/leg): As above  Symptoms are made worse with the following: Always Sitting, Always Rising, Always Standing and Time of day - No effect   Symptoms are made better with the following: Other - ibuprofen, stretching  Disturbed sleep (yes/no):  No Sleeping postures (prone/sup/side R/L): sup, lt  side  Previous episodes (0/1-5/6-10/11+): 7 yr hx Year of first episode:   Previous history: Yes.  Last episode 2017  Previous treatments: PT Nov-2018 - Significant improvement, flexion responder, but only has done 1 x daily        PATIENTS NAME:  Sabrina Burrell   : 1940    Specific Questions:  Cough/Sneeze/Strain (pos/neg): Neg  Bowel/Bladder (normal/abnormal): Bladder - has stimulator for incontinence  Gait (normal/abnormal): Normal  Medications (nil/NSAIDS/analg/steroids/anticoag/other):  Other -cholesterol, gabapentin  Medical allergies:  None  General health (excellent/good/fair/poor):  Good  Pertinent medical history:  Diabetes, Implanted device and Thyroid problems  Imaging (None/Xray/MRI/Other):  Nothing recent  Recent or major surgery (yes/no):  Lt TKA  Night pain (yes/no): No  Accidents (yes/no): No  Unexplained weight loss (yes/no): No  Barriers at home: None  Other red flags: None    EXAMINATION    Posture:   Sitting (good/fair/poor): Fair  Standing (good/fair/poor):Fair  Lordosis (red/acc/normal):   Correction of posture (better/worse/no effect): No effect    Lateral Shift (right/left/nil): Nil  Relevant (yes/no):  NA  Other Observations: NA    Neurological:    Motor deficit:  Rt hip flex 4-/5, knee ext 5/5; H-S 4/5; DF 4+/5; PF 5/5  Reflexes:    Sensory deficit:    Dural signs:  Pos slump (R)    Movement Loss:   Jose Mod Min Nil Pain   Flexion    X    Extension   X     Side Gliding R  X   Rt lat hip/thigh   Side Gliding L  X   Rt lat hip/thigh     Test Movements:   During: produces, abolishes, increases, decreases, no effect, centralizing, peripheralizing   After: better, worse, no better, no worse, no effect, centralized, peripheralized          PATIENTS NAME:  Sabrina Burrell   : 1940    Pretest symptoms standin/10 pain   Symptoms During Symptoms After ROM increased ROM decreased No Effect   FIS  (FISit) No effect No Effect      Rep FIS  (FISit) No effect No effect  X  (B) SGIS    Inc strength: Hip Flex, H-S, and DF     EIS        Rep EIS        Pretest symptoms lying:     Symptoms During Symptoms After ROM increased ROM decreased No Effect   AJ        Rep AJ        EIL        Rep EIL        If required, pretest symptoms:    Symptoms During Symptoms After ROM increased ROM decreased No Effect   SGIS - R        Rep SGIS - R        SGIS - L        Rep SGIS - L          Static Tests:  Sitting slouched:    Sitting erect:    Standing slouched   Standing erect:    Lying prone in extension:   Long sitting:      Other Tests:     Provisional Classification:  Derangement - Asymmetrical, unilateral, symptoms above knee    Principle of Management:  Education:  Posture, Centralization, Specificity of exercise and force progression to reduce and maintain  Equipment provided:  None, has lumbar roll  Mechanical therapy (Y/N):  Y   Extension principle:    Lateral Principle:    Flexion principle:  FISit x 10-15 reps 4-5 x daily  Other:          PATIENTS NAME:  Sabrina Burrell   : 1940    ASSESSMENT/PLAN:  Patient is a 77 year old female with lumbar complaints.    Patient has the following significant findings with corresponding treatment plan.                Diagnosis 1:  LBP    Pain -  self management, education, directional preference exercise and home program  Decreased ROM/flexibility - manual therapy, therapeutic exercise and home program  Decreased joint mobility - manual therapy, therapeutic exercise and home program  Decreased strength - therapeutic exercise, therapeutic activities and home program  Impaired muscle performance - neuro re-education and home program  Decreased function - therapeutic activities and home program  Impaired posture - neuro re-education and home program  Therapy Evaluation Codes:   1) History comprised of:   Personal factors that impact the plan of care:      None.    Comorbidity factors that impact the plan of care are:      Diabetes and Implanted  "device.     Medications impacting care: High blood pressure and hormone replacement, neurontin, thyroid.  2) Examination of Body Systems comprised of:   Body structures and functions that impact the plan of care:      Lumbar spine.   Activity limitations that impact the plan of care are:      Sitting, Standing and Rising.  3) Clinical presentation characteristics are:   Evolving/Changing.  4) Decision-Making    Moderate complexity using standardized patient assessment instrument and/or measureable assessment of functional outcome.  Cumulative Therapy Evaluation is: Moderate complexity.  Previous and current functional limitations:  (See Goal Flow Sheet for this information)    Short term and Long term goals: (See Goal Flow Sheet for this information)   Communication ability:  Patient appears to be able to clearly communicate and understand verbal and written communication and follow directions correctly.  Treatment Explanation - The following has been discussed with the patient:   RX ordered/plan of care  Anticipated outcomes  Possible risks and side effects  This patient would benefit from PT intervention to resume normal activities.   Rehab potential is good.  Frequency:  2 X a month, once daily  Duration:  for 2 months  Discharge Plan:  Achieve all LTG.  Independent in home treatment program.  Reach maximal therapeutic benefit.            PATIENTS NAME:  Sabrina Burrell   : 1940                    Caregiver Signature/Credentials _____________________________ Date ________            Chivo Addison DPT, Cert MDT   I have reviewed and certified the need for these services and plan of treatment while under my care.        PHYSICIAN'S SIGNATURE:   _____________________________________  Date___________                   Aline Austin MD    Certification period:  Beginning of Cert date period: 18 to  End of Cert period date: 10/16/18     Functional Level Progress Report: Please see attached \"Goal Flow " "sheet for Functional level.\"    ____X____ Continue Services or       ________ DC Services                Service dates: From  SOC Date: 07/19/18 date to present                         "

## 2018-07-19 NOTE — PROGRESS NOTES
[facility] Initial Evaluation -- Lumbar    Date: July 19, 2018  Sabrina Burrell is a 77 year old female with a lumbar spine condition.   Referral: GP  Work mechanical stresses:  NA  Employment status:  Retired  Leisure mechanical stresses: [employer] 2 x week pool; bike 40 min daily  Functional disability score (SEKOU/STarT Back):  See SEKOU/STarT Back flowsheets  VAS score (0-10): 5/10  Patient goals/expectations:  Trip planned to [email or web address] in November, so wants back is better shape by then.    HISTORY:    Present symptoms: (B) low back R > L, Rt glut to knee, Rt Groin  Pain quality (sharp/shooting/stabbing/aching/burning/cramping):  dull/sharp   Paresthesia (yes/no):  Yes, neuropathy (B) feet for years    Present since (onset date): 3 weeks.  MD referral date 7.11.18    Symptoms (improving/unchanging/worsening):  improving.     Symptoms commenced as a result of: No apparent reason   Condition occurred in the following environment:   Home     Symptoms at onset (back/thigh/leg): As above  Constant symptoms (back/thigh/leg): None  Intermittent symptoms (back/thigh/leg): As above    Symptoms are made worse with the following: Always Sitting, Always Rising, Always Standing and Time of day - No effect   Symptoms are made better with the following: Other - ibuprofen, stretching    Disturbed sleep (yes/no):  No Sleeping postures (prone/sup/side R/L): sup, lt side    Previous episodes (0/1-5/6-10/11+): 7 yr hx Year of first episode:     Previous history: Yes.  Last episode October 2017  Previous treatments: PT Nov-Jan 2018 - Significant improvement, flexion responder, but only has done 1 x daily      Specific Questions:  Cough/Sneeze/Strain (pos/neg): Neg  Bowel/Bladder (normal/abnormal): Bladder - has stimulator for incontinence  Gait (normal/abnormal): Normal  Medications (nil/NSAIDS/analg/steroids/anticoag/other):  Other -cholesterol, gabapentin  Medical allergies:  None  General health  (excellent/good/fair/poor):  Good  Pertinent medical history:  Diabetes, Implanted device and Thyroid problems  Imaging (None/Xray/MRI/Other):  Nothing recent  Recent or major surgery (yes/no):  Lt TKA  Night pain (yes/no): No  Accidents (yes/no): No  Unexplained weight loss (yes/no): No  Barriers at home: None  Other red flags: None    EXAMINATION    Posture:   Sitting (good/fair/poor): Fair  Standing (good/fair/poor):Fair  Lordosis (red/acc/normal):   Correction of posture (better/worse/no effect): No effect    Lateral Shift (right/left/nil): Nil  Relevant (yes/no):  NA  Other Observations: NA    Neurological:    Motor deficit:  Rt hip flex 4-/5, knee ext 5/5; H-S 4/5; DF 4+/5; PF 5/5  Reflexes:    Sensory deficit:    Dural signs:  Pos slump (R)    Movement Loss:   Jose Mod Min Nil Pain   Flexion    X    Extension   X     Side Gliding R  X   Rt lat hip/thigh   Side Gliding L  X   Rt lat hip/thigh     Test Movements:   During: produces, abolishes, increases, decreases, no effect, centralizing, peripheralizing   After: better, worse, no better, no worse, no effect, centralized, peripheralized    Pretest symptoms standin/10 pain   Symptoms During Symptoms After ROM increased ROM decreased No Effect   FIS  (FISit) No effect No Effect      Rep FIS  (FISit) No effect No effect X  (B) SGIS    Inc strength: Hip Flex, H-S, and DF     EIS        Rep EIS        Pretest symptoms lying:     Symptoms During Symptoms After ROM increased ROM decreased No Effect   AJ        Rep AJ        EIL        Rep EIL        If required, pretest symptoms:    Symptoms During Symptoms After ROM increased ROM decreased No Effect   SGIS - R        Rep SGIS - R        SGIS - L        Rep SGIS - L          Static Tests:  Sitting slouched:    Sitting erect:    Standing slouched   Standing erect:    Lying prone in extension:   Long sitting:      Other Tests:     Provisional Classification:  Derangement - Asymmetrical, unilateral, symptoms above  knee    Principle of Management:  Education:  Posture, Centralization, Specificity of exercise and force progression to reduce and maintain  Equipment provided:  None, has lumbar roll  Mechanical therapy (Y/N):  Y   Extension principle:    Lateral Principle:    Flexion principle:  FISit x 10-15 reps 4-5 x daily  Other:      ASSESSMENT/PLAN:    Patient is a 77 year old female with lumbar complaints.    Patient has the following significant findings with corresponding treatment plan.                Diagnosis 1:  LBP    Pain -  self management, education, directional preference exercise and home program  Decreased ROM/flexibility - manual therapy, therapeutic exercise and home program  Decreased joint mobility - manual therapy, therapeutic exercise and home program  Decreased strength - therapeutic exercise, therapeutic activities and home program  Impaired muscle performance - neuro re-education and home program  Decreased function - therapeutic activities and home program  Impaired posture - neuro re-education and home program    Therapy Evaluation Codes:   1) History comprised of:   Personal factors that impact the plan of care:      None.    Comorbidity factors that impact the plan of care are:      Diabetes and Implanted device.     Medications impacting care: High blood pressure and hormone replacement, neurontin, thyroid.  2) Examination of Body Systems comprised of:   Body structures and functions that impact the plan of care:      Lumbar spine.   Activity limitations that impact the plan of care are:      Sitting, Standing and Rising.  3) Clinical presentation characteristics are:   Evolving/Changing.  4) Decision-Making    Moderate complexity using standardized patient assessment instrument and/or measureable assessment of functional outcome.  Cumulative Therapy Evaluation is: Moderate complexity.    Previous and current functional limitations:  (See Goal Flow Sheet for this information)    Short term and  Long term goals: (See Goal Flow Sheet for this information)     Communication ability:  Patient appears to be able to clearly communicate and understand verbal and written communication and follow directions correctly.  Treatment Explanation - The following has been discussed with the patient:   RX ordered/plan of care  Anticipated outcomes  Possible risks and side effects  This patient would benefit from PT intervention to resume normal activities.   Rehab potential is good.    Frequency:  2 X a month, once daily  Duration:  for 2 months  Discharge Plan:  Achieve all LTG.  Independent in home treatment program.  Reach maximal therapeutic benefit.    Please refer to the daily flowsheet for treatment today, total treatment time and time spent performing 1:1 timed codes.

## 2018-08-07 ENCOUNTER — THERAPY VISIT (OUTPATIENT)
Dept: PHYSICAL THERAPY | Facility: CLINIC | Age: 78
End: 2018-08-07
Payer: MEDICARE

## 2018-08-07 DIAGNOSIS — M54.41 RIGHT-SIDED LOW BACK PAIN WITH RIGHT-SIDED SCIATICA, UNSPECIFIED CHRONICITY: ICD-10-CM

## 2018-08-07 PROCEDURE — 97110 THERAPEUTIC EXERCISES: CPT | Mod: GP | Performed by: PHYSICAL THERAPIST

## 2018-08-07 PROCEDURE — 97530 THERAPEUTIC ACTIVITIES: CPT | Mod: GP | Performed by: PHYSICAL THERAPIST

## 2018-08-14 ENCOUNTER — THERAPY VISIT (OUTPATIENT)
Dept: PHYSICAL THERAPY | Facility: CLINIC | Age: 78
End: 2018-08-14
Payer: MEDICARE

## 2018-08-14 DIAGNOSIS — M54.41 RIGHT-SIDED LOW BACK PAIN WITH RIGHT-SIDED SCIATICA, UNSPECIFIED CHRONICITY: ICD-10-CM

## 2018-08-14 PROCEDURE — 97110 THERAPEUTIC EXERCISES: CPT | Mod: GP | Performed by: PHYSICAL THERAPIST

## 2018-08-14 PROCEDURE — 97530 THERAPEUTIC ACTIVITIES: CPT | Mod: GP | Performed by: PHYSICAL THERAPIST

## 2018-08-28 ENCOUNTER — THERAPY VISIT (OUTPATIENT)
Dept: PHYSICAL THERAPY | Facility: CLINIC | Age: 78
End: 2018-08-28
Payer: MEDICARE

## 2018-08-28 DIAGNOSIS — M54.41 RIGHT-SIDED LOW BACK PAIN WITH RIGHT-SIDED SCIATICA, UNSPECIFIED CHRONICITY: ICD-10-CM

## 2018-08-28 PROCEDURE — 97110 THERAPEUTIC EXERCISES: CPT | Mod: GP | Performed by: PHYSICAL THERAPIST

## 2018-08-28 PROCEDURE — G8979 MOBILITY GOAL STATUS: HCPCS | Mod: GP | Performed by: PHYSICAL THERAPIST

## 2018-08-28 PROCEDURE — G8980 MOBILITY D/C STATUS: HCPCS | Mod: GP | Performed by: PHYSICAL THERAPIST

## 2018-08-28 PROCEDURE — 97530 THERAPEUTIC ACTIVITIES: CPT | Mod: GP | Performed by: PHYSICAL THERAPIST

## 2018-08-28 NOTE — PROGRESS NOTES
DISCHARGE REPORT    Progress reporting period is from 7.19.18 to 8.28.18.       SUBJECTIVE  Subjective changes noted by patient:  Pt states that pain is much better compared to 2 weeks ago.  Went back and saw Chiro and between that and stretching that has really helped.  Still gets instances of pain or weakness feeling in leg but then when she does her stretch it helps.  Has been consistent with performing 3-4 x daily.    Current Pain level: 0/10.     Initial Pain level: 5/10.   Changes in function:  Yes (See Goal flowsheet attached for changes in current functional level)  Adverse reaction to treatment or activity: None    OBJECTIVE  Objective: L/S AROM:  Flex WNL; Ext 25% limited; SG 25% limited (B).  Myotomes:  Rt Hip flex 5-/5; Knee ext 5/5; Knee flex 5-/5; DF 5/5; PF 5/5.  Repeated movements:  FISit - NE, NE, Inc ROM (Ext and (B) SGIS) and improved Rt Hip flex and knee flex strength to 5/5.      ASSESSMENT/PLAN  Updated problem list and treatment plan:   Diagnosis 1:  LBP    Pain -  self management, education, directional preference exercise and home program  Decreased ROM/flexibility - therapeutic exercise and home program  Decreased joint mobility - therapeutic exercise and home program  Decreased strength - therapeutic exercise, therapeutic activities and home program  Impaired muscle performance - neuro re-education and home program  STG/LTGs have been met or progress has been made towards goals:  Yes (See Goal flow sheet completed today.)  Assessment of Progress: The patient's condition is improving.  The patient has met all of their long term goals.  Self Management Plans:  Patient is independent in a home treatment program.  Patient is independent in self management of symptoms.  I have re-evaluated this patient and find that the nature, scope, duration and intensity of the therapy is appropriate for the medical condition of the patient.  Sabrina continues to require the following intervention to meet STG  and LTG's:  PT intervention is no longer required to meet STG/LTG.    Recommendations:  This patient is ready to be discharged from therapy and continue their home treatment program.

## 2018-08-28 NOTE — LETTER
[facility] PHYSICAL THERAPY  [address]   [place] [address]  [phone]    2018    Re: Sabrina Burrell   :   1940  MRN:  [id number]   REFERRING PHYSICIAN:   Aline Austin    [facility] PHYSICAL THERAPY    Date of Initial Evaluation:  2018  Visits:    4  Reason for Referral:  Right-sided low back pain with right-sided sciatica, unspecified chronicity    DISCHARGE REPORT: Progress reporting period is from 18 to 18.       SUBJECTIVE  Subjective changes noted by patient:  Pt states that pain is much better compared to 2 weeks ago.  Went back and saw Chiro and between that and stretching that has really helped.  Still gets instances of pain or weakness feeling in leg but then when she does her stretch it helps.  Has been consistent with performing 3-4 x daily.    Current Pain level: 0/10.    Initial Pain level: 5/10.   Changes in function:  Yes (See Goal flowsheet attached for changes in current functional level). Adverse reaction to treatment or activity: None    OBJECTIVE  Objective: L/S AROM:  Flex WNL; Ext 25% limited; SG 25% limited (B).  Myotomes:  Rt Hip flex 5-/5; Knee ext 5/5; Knee flex 5-/5; DF 5/5; PF 5/5.  Repeated movements:  FISit - NE, NE, Inc ROM (Ext and (B) SGIS) and improved Rt Hip flex and knee flex strength to 5/5.    ASSESSMENT/PLAN  Updated problem list and treatment plan:   Diagnosis 1:  LBP    Pain -  self management, education, directional preference exercise and home program  Decreased ROM/flexibility - therapeutic exercise and home program  Decreased joint mobility - therapeutic exercise and home program  Decreased strength - therapeutic exercise, therapeutic activities and home program  Impaired muscle performance - neuro re-education and home program  STG/LTGs have been met or progress has been made towards goals:  Yes (See Goal flow sheet completed today.)  Assessment of  Progress: The patient's condition is improving.  The patient has met all of their long term goals.  Self Management Plans:  Patient is independent in a home treatment program.  Patient is independent in self management of symptoms.  I have re-evaluated this patient and find that the nature, scope, duration and intensity of the therapy is appropriate for the medical condition of the patient.  Sabrina continues to require the following intervention to meet STG and LTG's:  PT intervention is no longer required to meet STG/LTG.    Re: Sabrina CARVALHO Indra   :   1940    Recommendations:  This patient is ready to be discharged from therapy and continue their home treatment program.    Thank you for your referral.    INQUIRIES        Therapist:  Chalo Addison DPT, Cert. MDT   [facility]  [address] [place] [address] [address]  [facility] [address]  Phone: [phone]  Fax: [phone]

## 2019-07-19 RX ORDER — GABAPENTIN 300 MG/1
900 CAPSULE ORAL AT BEDTIME
COMMUNITY

## 2019-07-19 RX ORDER — PIOGLITAZONEHYDROCHLORIDE 15 MG/1
15 TABLET ORAL DAILY
Status: ON HOLD | COMMUNITY
End: 2019-07-22

## 2019-07-19 RX ORDER — ASPIRIN 81 MG/1
81 TABLET ORAL DAILY
Status: ON HOLD | COMMUNITY
End: 2019-07-22

## 2019-07-19 RX ORDER — UBIDECARENONE 100 MG
100 CAPSULE ORAL DAILY
COMMUNITY
End: 2024-06-19

## 2019-07-19 RX ORDER — EZETIMIBE 10 MG/1
10 TABLET ORAL AT BEDTIME
COMMUNITY

## 2019-07-19 RX ORDER — HYDROCORTISONE VALERATE CREAM 2 MG/G
CREAM TOPICAL 2 TIMES DAILY PRN
COMMUNITY

## 2019-07-19 RX ORDER — FLUOCINONIDE TOPICAL SOLUTION USP, 0.05% 0.5 MG/ML
SOLUTION TOPICAL 2 TIMES DAILY PRN
COMMUNITY

## 2019-07-19 RX ORDER — IBUPROFEN 200 MG
200-400 TABLET ORAL EVERY 4 HOURS PRN
Status: ON HOLD | COMMUNITY
End: 2019-07-22

## 2019-07-22 ENCOUNTER — ANESTHESIA EVENT (OUTPATIENT)
Dept: SURGERY | Facility: CLINIC | Age: 79
End: 2019-07-22
Payer: COMMERCIAL

## 2019-07-22 ENCOUNTER — HOSPITAL ENCOUNTER (OUTPATIENT)
Facility: CLINIC | Age: 79
Discharge: HOME | End: 2019-07-23
Attending: UROLOGY | Admitting: UROLOGY
Payer: COMMERCIAL

## 2019-07-22 ENCOUNTER — ANESTHESIA (OUTPATIENT)
Dept: SURGERY | Facility: CLINIC | Age: 79
End: 2019-07-22
Payer: COMMERCIAL

## 2019-07-22 DIAGNOSIS — N81.4 CYSTOCELE WITH PROLAPSE: Primary | ICD-10-CM

## 2019-07-22 LAB
CREAT SERPL-MCNC: 1.09 MG/DL (ref 0.52–1.04)
GFR SERPL CREATININE-BSD FRML MDRD: 49 ML/MIN/{1.73_M2}
GLUCOSE BLDC GLUCOMTR-MCNC: 124 MG/DL (ref 70–99)
GLUCOSE BLDC GLUCOMTR-MCNC: 222 MG/DL (ref 70–99)

## 2019-07-22 PROCEDURE — 25000125 ZZHC RX 250: Performed by: NURSE ANESTHETIST, CERTIFIED REGISTERED

## 2019-07-22 PROCEDURE — 37000009 ZZH ANESTHESIA TECHNICAL FEE, EACH ADDTL 15 MIN: Performed by: UROLOGY

## 2019-07-22 PROCEDURE — 25000132 ZZH RX MED GY IP 250 OP 250 PS 637: Performed by: PHYSICIAN ASSISTANT

## 2019-07-22 PROCEDURE — 71000013 ZZH RECOVERY PHASE 1 LEVEL 1 EA ADDTL HR: Performed by: UROLOGY

## 2019-07-22 PROCEDURE — 25000128 H RX IP 250 OP 636: Performed by: UROLOGY

## 2019-07-22 PROCEDURE — 36000085 ZZH SURGERY LEVEL 8 1ST 30 MIN: Performed by: UROLOGY

## 2019-07-22 PROCEDURE — 25000128 H RX IP 250 OP 636: Performed by: SURGERY

## 2019-07-22 PROCEDURE — 25000128 H RX IP 250 OP 636: Performed by: PHYSICIAN ASSISTANT

## 2019-07-22 PROCEDURE — 25000125 ZZHC RX 250: Performed by: UROLOGY

## 2019-07-22 PROCEDURE — 25800025 ZZH RX 258: Performed by: UROLOGY

## 2019-07-22 PROCEDURE — 25000128 H RX IP 250 OP 636: Performed by: NURSE ANESTHETIST, CERTIFIED REGISTERED

## 2019-07-22 PROCEDURE — 99207 ZZC CDG-MDM COMPONENT: MEETS LOW - DOWN CODED: CPT | Performed by: HOSPITALIST

## 2019-07-22 PROCEDURE — 40000170 ZZH STATISTIC PRE-PROCEDURE ASSESSMENT II: Performed by: UROLOGY

## 2019-07-22 PROCEDURE — C1781 MESH (IMPLANTABLE): HCPCS | Performed by: UROLOGY

## 2019-07-22 PROCEDURE — 36000087 ZZH SURGERY LEVEL 8 EA 15 ADDTL MIN: Performed by: UROLOGY

## 2019-07-22 PROCEDURE — 37000008 ZZH ANESTHESIA TECHNICAL FEE, 1ST 30 MIN: Performed by: UROLOGY

## 2019-07-22 PROCEDURE — 25000131 ZZH RX MED GY IP 250 OP 636 PS 637: Performed by: PHYSICIAN ASSISTANT

## 2019-07-22 PROCEDURE — 25800030 ZZH RX IP 258 OP 636: Performed by: NURSE ANESTHETIST, CERTIFIED REGISTERED

## 2019-07-22 PROCEDURE — 36415 COLL VENOUS BLD VENIPUNCTURE: CPT | Performed by: UROLOGY

## 2019-07-22 PROCEDURE — 82565 ASSAY OF CREATININE: CPT | Performed by: UROLOGY

## 2019-07-22 PROCEDURE — 99219 ZZC INITIAL OBSERVATION CARE,LEVL II: CPT | Performed by: HOSPITALIST

## 2019-07-22 PROCEDURE — 71000012 ZZH RECOVERY PHASE 1 LEVEL 1 FIRST HR: Performed by: UROLOGY

## 2019-07-22 PROCEDURE — 82962 GLUCOSE BLOOD TEST: CPT

## 2019-07-22 PROCEDURE — 27210794 ZZH OR GENERAL SUPPLY STERILE: Performed by: UROLOGY

## 2019-07-22 PROCEDURE — 25000566 ZZH SEVOFLURANE, EA 15 MIN: Performed by: UROLOGY

## 2019-07-22 PROCEDURE — 99207 ZZC APP CREDIT; MD BILLING SHARED VISIT: CPT | Performed by: PHYSICIAN ASSISTANT

## 2019-07-22 DEVICE — MESH SLING Y SHAPE RESTORELLE 24X4CM 501420: Type: IMPLANTABLE DEVICE | Site: PELVIS | Status: FUNCTIONAL

## 2019-07-22 RX ORDER — ESTRADIOL 0.1 MG/G
2 CREAM VAGINAL AT BEDTIME
Qty: 42.5 G | Refills: 0 | Status: SHIPPED | OUTPATIENT
Start: 2019-07-22 | End: 2024-06-19

## 2019-07-22 RX ORDER — ONDANSETRON 2 MG/ML
4 INJECTION INTRAMUSCULAR; INTRAVENOUS EVERY 30 MIN PRN
Status: DISCONTINUED | OUTPATIENT
Start: 2019-07-22 | End: 2019-07-22 | Stop reason: HOSPADM

## 2019-07-22 RX ORDER — LIDOCAINE HYDROCHLORIDE 20 MG/ML
INJECTION, SOLUTION INFILTRATION; PERINEURAL PRN
Status: DISCONTINUED | OUTPATIENT
Start: 2019-07-22 | End: 2019-07-22

## 2019-07-22 RX ORDER — MAGNESIUM HYDROXIDE 1200 MG/15ML
LIQUID ORAL PRN
Status: DISCONTINUED | OUTPATIENT
Start: 2019-07-22 | End: 2019-07-22 | Stop reason: HOSPADM

## 2019-07-22 RX ORDER — ACETAMINOPHEN 325 MG/1
650 TABLET ORAL DAILY PRN
Status: ON HOLD | COMMUNITY
End: 2024-06-25

## 2019-07-22 RX ORDER — NICOTINE POLACRILEX 4 MG
15-30 LOZENGE BUCCAL
Status: DISCONTINUED | OUTPATIENT
Start: 2019-07-22 | End: 2019-07-23 | Stop reason: HOSPADM

## 2019-07-22 RX ORDER — GLYCOPYRROLATE 0.2 MG/ML
INJECTION, SOLUTION INTRAMUSCULAR; INTRAVENOUS PRN
Status: DISCONTINUED | OUTPATIENT
Start: 2019-07-22 | End: 2019-07-22

## 2019-07-22 RX ORDER — PROPOFOL 10 MG/ML
INJECTION, EMULSION INTRAVENOUS PRN
Status: DISCONTINUED | OUTPATIENT
Start: 2019-07-22 | End: 2019-07-22

## 2019-07-22 RX ORDER — CIPROFLOXACIN 500 MG/1
500 TABLET, FILM COATED ORAL 2 TIMES DAILY
Qty: 10 TABLET | Refills: 0 | Status: SHIPPED | OUTPATIENT
Start: 2019-07-22 | End: 2019-07-27

## 2019-07-22 RX ORDER — CEFAZOLIN SODIUM 1 G/3ML
1 INJECTION, POWDER, FOR SOLUTION INTRAMUSCULAR; INTRAVENOUS EVERY 8 HOURS
Status: COMPLETED | OUTPATIENT
Start: 2019-07-22 | End: 2019-07-23

## 2019-07-22 RX ORDER — CIPROFLOXACIN 2 MG/ML
400 INJECTION, SOLUTION INTRAVENOUS EVERY 12 HOURS
Status: DISCONTINUED | OUTPATIENT
Start: 2019-07-23 | End: 2019-07-23 | Stop reason: HOSPADM

## 2019-07-22 RX ORDER — ONDANSETRON 4 MG/1
4 TABLET, ORALLY DISINTEGRATING ORAL EVERY 30 MIN PRN
Status: DISCONTINUED | OUTPATIENT
Start: 2019-07-22 | End: 2019-07-22 | Stop reason: HOSPADM

## 2019-07-22 RX ORDER — SODIUM CHLORIDE, SODIUM LACTATE, POTASSIUM CHLORIDE, CALCIUM CHLORIDE 600; 310; 30; 20 MG/100ML; MG/100ML; MG/100ML; MG/100ML
INJECTION, SOLUTION INTRAVENOUS CONTINUOUS PRN
Status: DISCONTINUED | OUTPATIENT
Start: 2019-07-22 | End: 2019-07-22

## 2019-07-22 RX ORDER — MEPERIDINE HYDROCHLORIDE 25 MG/ML
12.5 INJECTION INTRAMUSCULAR; INTRAVENOUS; SUBCUTANEOUS
Status: DISCONTINUED | OUTPATIENT
Start: 2019-07-22 | End: 2019-07-22 | Stop reason: HOSPADM

## 2019-07-22 RX ORDER — NEOSTIGMINE METHYLSULFATE 1 MG/ML
VIAL (ML) INJECTION PRN
Status: DISCONTINUED | OUTPATIENT
Start: 2019-07-22 | End: 2019-07-22

## 2019-07-22 RX ORDER — LABETALOL HYDROCHLORIDE 5 MG/ML
10 INJECTION, SOLUTION INTRAVENOUS
Status: DISCONTINUED | OUTPATIENT
Start: 2019-07-22 | End: 2019-07-23 | Stop reason: HOSPADM

## 2019-07-22 RX ORDER — BUPIVACAINE HYDROCHLORIDE 2.5 MG/ML
INJECTION, SOLUTION EPIDURAL; INFILTRATION; INTRACAUDAL PRN
Status: DISCONTINUED | OUTPATIENT
Start: 2019-07-22 | End: 2019-07-22 | Stop reason: HOSPADM

## 2019-07-22 RX ORDER — LEVOTHYROXINE SODIUM 100 UG/1
50 TABLET ORAL WEEKLY
COMMUNITY
End: 2024-06-19

## 2019-07-22 RX ORDER — PROCHLORPERAZINE MALEATE 5 MG
5 TABLET ORAL EVERY 6 HOURS PRN
Status: DISCONTINUED | OUTPATIENT
Start: 2019-07-22 | End: 2019-07-23 | Stop reason: HOSPADM

## 2019-07-22 RX ORDER — DEXTROSE MONOHYDRATE 25 G/50ML
25-50 INJECTION, SOLUTION INTRAVENOUS
Status: DISCONTINUED | OUTPATIENT
Start: 2019-07-22 | End: 2019-07-23 | Stop reason: HOSPADM

## 2019-07-22 RX ORDER — NALOXONE HYDROCHLORIDE 0.4 MG/ML
.1-.4 INJECTION, SOLUTION INTRAMUSCULAR; INTRAVENOUS; SUBCUTANEOUS
Status: DISCONTINUED | OUTPATIENT
Start: 2019-07-22 | End: 2019-07-22 | Stop reason: HOSPADM

## 2019-07-22 RX ORDER — HYDROMORPHONE HYDROCHLORIDE 1 MG/ML
0.2 INJECTION, SOLUTION INTRAMUSCULAR; INTRAVENOUS; SUBCUTANEOUS
Status: DISCONTINUED | OUTPATIENT
Start: 2019-07-22 | End: 2019-07-23 | Stop reason: HOSPADM

## 2019-07-22 RX ORDER — DIPHENHYDRAMINE HYDROCHLORIDE 50 MG/ML
12.5 INJECTION INTRAMUSCULAR; INTRAVENOUS EVERY 6 HOURS PRN
Status: DISCONTINUED | OUTPATIENT
Start: 2019-07-22 | End: 2019-07-23 | Stop reason: HOSPADM

## 2019-07-22 RX ORDER — ASPIRIN 81 MG/1
81 TABLET ORAL DAILY
Qty: 12 TABLET | Status: ON HOLD | COMMUNITY
Start: 2019-07-29 | End: 2024-06-24

## 2019-07-22 RX ORDER — DIPHENHYDRAMINE HCL 12.5MG/5ML
12.5 LIQUID (ML) ORAL EVERY 6 HOURS PRN
Status: DISCONTINUED | OUTPATIENT
Start: 2019-07-22 | End: 2019-07-23 | Stop reason: HOSPADM

## 2019-07-22 RX ORDER — NALOXONE HYDROCHLORIDE 0.4 MG/ML
.1-.4 INJECTION, SOLUTION INTRAMUSCULAR; INTRAVENOUS; SUBCUTANEOUS
Status: DISCONTINUED | OUTPATIENT
Start: 2019-07-22 | End: 2019-07-23 | Stop reason: HOSPADM

## 2019-07-22 RX ORDER — ONDANSETRON 4 MG/1
4 TABLET, ORALLY DISINTEGRATING ORAL EVERY 6 HOURS PRN
Status: DISCONTINUED | OUTPATIENT
Start: 2019-07-22 | End: 2019-07-23 | Stop reason: HOSPADM

## 2019-07-22 RX ORDER — ONDANSETRON 2 MG/ML
INJECTION INTRAMUSCULAR; INTRAVENOUS PRN
Status: DISCONTINUED | OUTPATIENT
Start: 2019-07-22 | End: 2019-07-22

## 2019-07-22 RX ORDER — SODIUM CHLORIDE, SODIUM LACTATE, POTASSIUM CHLORIDE, CALCIUM CHLORIDE 600; 310; 30; 20 MG/100ML; MG/100ML; MG/100ML; MG/100ML
INJECTION, SOLUTION INTRAVENOUS CONTINUOUS
Status: DISCONTINUED | OUTPATIENT
Start: 2019-07-22 | End: 2019-07-22 | Stop reason: HOSPADM

## 2019-07-22 RX ORDER — ONDANSETRON 2 MG/ML
4 INJECTION INTRAMUSCULAR; INTRAVENOUS EVERY 6 HOURS PRN
Status: DISCONTINUED | OUTPATIENT
Start: 2019-07-22 | End: 2019-07-23 | Stop reason: HOSPADM

## 2019-07-22 RX ORDER — ASPIRIN 81 MG
100 TABLET, DELAYED RELEASE (ENTERIC COATED) ORAL 2 TIMES DAILY PRN
Qty: 60 TABLET | Refills: 0 | Status: ON HOLD | OUTPATIENT
Start: 2019-07-22 | End: 2024-06-24

## 2019-07-22 RX ORDER — HYDROCODONE BITARTRATE AND ACETAMINOPHEN 5; 325 MG/1; MG/1
1-2 TABLET ORAL EVERY 4 HOURS PRN
Qty: 22 TABLET | Refills: 0 | Status: ON HOLD | OUTPATIENT
Start: 2019-07-22 | End: 2024-06-24

## 2019-07-22 RX ORDER — SODIUM CHLORIDE, SODIUM LACTATE, POTASSIUM CHLORIDE, CALCIUM CHLORIDE 600; 310; 30; 20 MG/100ML; MG/100ML; MG/100ML; MG/100ML
INJECTION, SOLUTION INTRAVENOUS CONTINUOUS
Status: DISCONTINUED | OUTPATIENT
Start: 2019-07-22 | End: 2019-07-23 | Stop reason: HOSPADM

## 2019-07-22 RX ORDER — PROPOFOL 10 MG/ML
INJECTION, EMULSION INTRAVENOUS CONTINUOUS PRN
Status: DISCONTINUED | OUTPATIENT
Start: 2019-07-22 | End: 2019-07-22

## 2019-07-22 RX ORDER — KETOROLAC TROMETHAMINE 15 MG/ML
15 INJECTION, SOLUTION INTRAMUSCULAR; INTRAVENOUS EVERY 6 HOURS
Status: DISCONTINUED | OUTPATIENT
Start: 2019-07-22 | End: 2019-07-22

## 2019-07-22 RX ORDER — DEXAMETHASONE SODIUM PHOSPHATE 4 MG/ML
INJECTION, SOLUTION INTRA-ARTICULAR; INTRALESIONAL; INTRAMUSCULAR; INTRAVENOUS; SOFT TISSUE PRN
Status: DISCONTINUED | OUTPATIENT
Start: 2019-07-22 | End: 2019-07-22

## 2019-07-22 RX ORDER — CEFAZOLIN SODIUM 2 G/100ML
2 INJECTION, SOLUTION INTRAVENOUS
Status: COMPLETED | OUTPATIENT
Start: 2019-07-22 | End: 2019-07-22

## 2019-07-22 RX ORDER — FENTANYL CITRATE 50 UG/ML
25-50 INJECTION, SOLUTION INTRAMUSCULAR; INTRAVENOUS
Status: DISCONTINUED | OUTPATIENT
Start: 2019-07-22 | End: 2019-07-22 | Stop reason: HOSPADM

## 2019-07-22 RX ORDER — ESTRADIOL AND NORETHINDRONE ACETATE 1; .5 MG/1; MG/1
1 TABLET ORAL DAILY
Status: ON HOLD | COMMUNITY
End: 2019-07-23

## 2019-07-22 RX ORDER — LABETALOL HYDROCHLORIDE 5 MG/ML
10 INJECTION, SOLUTION INTRAVENOUS
Status: DISCONTINUED | OUTPATIENT
Start: 2019-07-22 | End: 2019-07-22 | Stop reason: HOSPADM

## 2019-07-22 RX ORDER — PHENAZOPYRIDINE HYDROCHLORIDE 100 MG/1
100 TABLET, FILM COATED ORAL
Status: DISCONTINUED | OUTPATIENT
Start: 2019-07-22 | End: 2019-07-22 | Stop reason: HOSPADM

## 2019-07-22 RX ORDER — FENTANYL CITRATE 50 UG/ML
INJECTION, SOLUTION INTRAMUSCULAR; INTRAVENOUS PRN
Status: DISCONTINUED | OUTPATIENT
Start: 2019-07-22 | End: 2019-07-22

## 2019-07-22 RX ORDER — HYDROMORPHONE HYDROCHLORIDE 1 MG/ML
.3-.5 INJECTION, SOLUTION INTRAMUSCULAR; INTRAVENOUS; SUBCUTANEOUS EVERY 10 MIN PRN
Status: DISCONTINUED | OUTPATIENT
Start: 2019-07-22 | End: 2019-07-22 | Stop reason: HOSPADM

## 2019-07-22 RX ORDER — KETOROLAC TROMETHAMINE 15 MG/ML
15 INJECTION, SOLUTION INTRAMUSCULAR; INTRAVENOUS EVERY 6 HOURS PRN
Status: DISCONTINUED | OUTPATIENT
Start: 2019-07-22 | End: 2019-07-23 | Stop reason: HOSPADM

## 2019-07-22 RX ADMIN — ROCURONIUM BROMIDE 30 MG: 10 INJECTION INTRAVENOUS at 13:36

## 2019-07-22 RX ADMIN — FENTANYL CITRATE 50 MCG: 50 INJECTION, SOLUTION INTRAMUSCULAR; INTRAVENOUS at 15:00

## 2019-07-22 RX ADMIN — PROPOFOL 200 MG: 10 INJECTION, EMULSION INTRAVENOUS at 13:35

## 2019-07-22 RX ADMIN — PHENAZOPYRIDINE HYDROCHLORIDE 100 MG: 100 TABLET, FILM COATED ORAL at 11:48

## 2019-07-22 RX ADMIN — SODIUM CHLORIDE, POTASSIUM CHLORIDE, SODIUM LACTATE AND CALCIUM CHLORIDE: 600; 310; 30; 20 INJECTION, SOLUTION INTRAVENOUS at 13:28

## 2019-07-22 RX ADMIN — LIDOCAINE HYDROCHLORIDE 100 MG: 20 INJECTION, SOLUTION INFILTRATION; PERINEURAL at 13:35

## 2019-07-22 RX ADMIN — ONDANSETRON 4 MG: 2 INJECTION INTRAMUSCULAR; INTRAVENOUS at 17:43

## 2019-07-22 RX ADMIN — HYDROMORPHONE HYDROCHLORIDE 0.5 MG: 1 INJECTION, SOLUTION INTRAMUSCULAR; INTRAVENOUS; SUBCUTANEOUS at 14:14

## 2019-07-22 RX ADMIN — ROCURONIUM BROMIDE 10 MG: 10 INJECTION INTRAVENOUS at 15:33

## 2019-07-22 RX ADMIN — DEXAMETHASONE SODIUM PHOSPHATE 4 MG: 4 INJECTION, SOLUTION INTRA-ARTICULAR; INTRALESIONAL; INTRAMUSCULAR; INTRAVENOUS; SOFT TISSUE at 14:58

## 2019-07-22 RX ADMIN — INSULIN ASPART 1 UNITS: 100 INJECTION, SOLUTION INTRAVENOUS; SUBCUTANEOUS at 21:53

## 2019-07-22 RX ADMIN — NEOSTIGMINE METHYLSULFATE 3 MG: 1 INJECTION, SOLUTION INTRAVENOUS at 16:24

## 2019-07-22 RX ADMIN — GLYCOPYRROLATE 0.6 MG: 0.2 INJECTION, SOLUTION INTRAMUSCULAR; INTRAVENOUS at 16:24

## 2019-07-22 RX ADMIN — FENTANYL CITRATE 50 MCG: 50 INJECTION, SOLUTION INTRAMUSCULAR; INTRAVENOUS at 15:11

## 2019-07-22 RX ADMIN — FENTANYL CITRATE 50 MCG: 50 INJECTION, SOLUTION INTRAMUSCULAR; INTRAVENOUS at 14:10

## 2019-07-22 RX ADMIN — FENTANYL CITRATE 25 MCG: 50 INJECTION, SOLUTION INTRAMUSCULAR; INTRAVENOUS at 13:29

## 2019-07-22 RX ADMIN — ONDANSETRON 4 MG: 2 INJECTION INTRAMUSCULAR; INTRAVENOUS at 15:57

## 2019-07-22 RX ADMIN — SODIUM CHLORIDE, POTASSIUM CHLORIDE, SODIUM LACTATE AND CALCIUM CHLORIDE: 600; 310; 30; 20 INJECTION, SOLUTION INTRAVENOUS at 15:59

## 2019-07-22 RX ADMIN — CEFAZOLIN SODIUM 2 G: 2 INJECTION, SOLUTION INTRAVENOUS at 13:44

## 2019-07-22 RX ADMIN — DEXMEDETOMIDINE HYDROCHLORIDE 10 MCG: 100 INJECTION, SOLUTION INTRAVENOUS at 14:55

## 2019-07-22 RX ADMIN — DEXMEDETOMIDINE HYDROCHLORIDE 10 MCG: 100 INJECTION, SOLUTION INTRAVENOUS at 14:36

## 2019-07-22 RX ADMIN — ROCURONIUM BROMIDE 10 MG: 10 INJECTION INTRAVENOUS at 14:36

## 2019-07-22 RX ADMIN — CEFAZOLIN 1 G: 1 INJECTION, POWDER, FOR SOLUTION INTRAMUSCULAR; INTRAVENOUS at 21:58

## 2019-07-22 RX ADMIN — ROCURONIUM BROMIDE 20 MG: 10 INJECTION INTRAVENOUS at 14:04

## 2019-07-22 RX ADMIN — PROPOFOL 30 MCG/KG/MIN: 10 INJECTION, EMULSION INTRAVENOUS at 13:42

## 2019-07-22 RX ADMIN — FENTANYL CITRATE 25 MCG: 50 INJECTION, SOLUTION INTRAMUSCULAR; INTRAVENOUS at 13:35

## 2019-07-22 RX ADMIN — PROCHLORPERAZINE EDISYLATE 5 MG: 5 INJECTION INTRAMUSCULAR; INTRAVENOUS at 20:51

## 2019-07-22 ASSESSMENT — MIFFLIN-ST. JEOR: SCORE: 1100.96

## 2019-07-22 NOTE — ANESTHESIA PREPROCEDURE EVALUATION
Anesthesia Pre-Procedure Evaluation    Patient: Sabrina Burrell   MRN: [id number] : 1940          Preoperative Diagnosis: GENITAL PROLAPSE    Procedure(s):  ROBOTIC ASSISTED XI SACROCOLPOPEXY, CYSTOSCOPY, POSSIBLE MIDURETHRAL SLING  POSSIBLE SALPINGO-OOPHORECTOMY, BILATERAL, ROBOT-ASSISTED, USING DA LOUIE XI    Past Medical History:   Diagnosis Date     Diabetes (H)      Hyperlipidemia      PONV (postoperative nausea and vomiting)      Thyroid disease     HYPOTHYROID     Past Surgical History:   Procedure Laterality Date     APPENDECTOMY       GENITOURINARY SURGERY      INTERSTIM     GYN SURGERY      D AND C     IMPLANT STIMULATOR AND LEADS SACRAL NERVE (STAGE ONE AND TWO) N/A 2017    Procedure: IMPLANT STIMULATOR AND LEADS SACRAL NERVE (STAGE ONE AND TWO);  INTERSTIM STAGE ONE AND TWO PLACEMENT;  Surgeon: Barrie Oro MD;  Location:  OR     ORTHOPEDIC SURGERY      LEFT KNEE REPLACEMENT     PHACOEMULSIFICATION WITH STANDARD INTRAOCULAR LENS IMPLANT  10/7/2013    Procedure: PHACOEMULSIFICATION WITH STANDARD INTRAOCULAR LENS IMPLANT;  Right Kelman Phacoemulsification with Intraocular Lens Implant;  Surgeon: Tian Yates MD;  Location: [facility] OR     Allergies   Allergen Reactions     Nkda [No Known Drug Allergies]      Pravastatin Nausea     Simvastatin Muscle Pain (Myalgia)     Social History     Tobacco Use     Smoking status: Never Smoker     Smokeless tobacco: Never Used   Substance Use Topics     Alcohol use: Yes     Comment: 1 PER WEEK     Prior to Admission medications    Medication Sig Start Date End Date Taking? Authorizing Provider   acetaminophen (TYLENOL) 325 MG tablet Take 650 mg by mouth daily as needed for mild pain   Yes Reported, Patient   allopurinol (ZYLOPRIM) 300 MG tablet Take 300 mg by mouth    Yes Reported, Patient   Ascorbic Acid (VITAMIN C PO) Take 1 tablet by mouth daily    Yes Reported, Patient   aspirin 81 MG EC tablet Take 81 mg by mouth daily   Yes Reported,  Patient   cetirizine (ZYRTEC) 10 MG tablet Take 10 mg by mouth At Bedtime    Yes Reported, Patient   co-enzyme Q-10 100 MG CAPS capsule Take 100 mg by mouth daily   Yes Reported, Patient   diclofenac (VOLTAREN) 1 % topical gel Place onto the skin 4 times daily as needed    Yes Reported, Patient   estradiol-norethindrone (ACTIVELLA) 1-0.5 MG tablet Take 1 tablet by mouth daily   Yes Reported, Patient   ezetimibe (ZETIA) 10 MG tablet Take 10 mg by mouth At Bedtime    Yes Reported, Patient   Fish Oil-Krill Oil (KRILL OIL PLUS PO) Take 1 capsule by mouth daily    Yes Reported, Patient   fluocinonide (LIDEX) 0.05 % external solution Apply topically 2 times daily as needed   Yes Reported, Patient   gabapentin (NEURONTIN) 300 MG capsule Take 600 mg by mouth At Bedtime (2 x 300 mg = 600 mg dose)   Yes Reported, Patient   hydrocortisone (WESTCORT) 0.2 % external cream Apply topically 2 times daily as needed   Yes Reported, Patient   levothyroxine (SYNTHROID,LEVOTHROID) 100 MCG tablet Take 100 mcg by mouth See Admin Instructions Six day weekly Tuesday, Wednesday, Thursday, Friday, Saturday and Sunday. (takes 1/2 tablet 50 mcg on Mondays)   Yes Reported, Patient   levothyroxine (SYNTHROID/LEVOTHROID) 100 MCG tablet Take 50 mcg by mouth once a week (0.5 x 100 mcg = 50 mcg dose) on Mondays   Yes Reported, Patient   lisinopril (ZESTRIL) 10 MG tablet Take 10 mg by mouth At Bedtime    Yes Reported, Patient   metFORMIN (GLUCOPHAGE-XR) 500 MG 24 hr tablet Take 500 mg by mouth 2 times daily    Yes Reported, Patient   polyethylene glycol-propylene glycol (SYSTANE ULTRA) 0.4-0.3 % SOLN ophthalmic solution Place 1 drop into both eyes daily as needed for dry eyes   Yes Reported, Patient   Probiotic Product (PROBIOTIC & ACIDOPHILUS EX ST PO) Take 1 capsule by mouth daily    Yes Reported, Patient   solifenacin (VESICARE) 10 MG tablet Take 5-10 mg by mouth daily as needed    Yes Reported, Patient   triamcinolone (KENALOG) 0.1 % cream Apply  topically daily as needed    Yes Reported, Patient   vitamin D3 (VITAMIN D3) 1000 units (25 mcg) tablet Take 1,000 Units by mouth daily    Yes Reported, Patient     Current Facility-Administered Medications Ordered in Epic   Medication Dose Route Frequency Last Rate Last Dose     ceFAZolin (ANCEF) intermittent infusion 2 g in 100 mL dextrose PRE-MIX  2 g Intravenous Pre-Op/Pre-procedure x 1 dose         phenazopyridine (PYRIDIUM) tablet 100 mg  100 mg Oral 60 Min Pre-Op         No current [facility]-ordered outpatient medications on file.       No results for input(s): NA, POTASSIUM, CHLORIDE, CO2, ANIONGAP, GLC, BUN, CR, KIMBERLY in the last 09581 hours.  No results for input(s): WBC, HGB, PLT in the last 92779 hours.  No results for input(s): ABO, RH in the last 52855 hours.  No results for input(s): TROPI in the last 83252 hours.  No results for input(s): PH, PCO2, PO2, HCO3 in the last 35465 hours.  No results for input(s): HCG in the last 44721 hours.  No results found for this or any previous visit (from the past 744 hour(s)).    RECENT LABS:       Anesthesia Evaluation     . Pt has had prior anesthetic.     History of anesthetic complications   - PONV        ROS/MED HX    ENT/Pulmonary:     (+)allergic rhinitis, , . .    Neurologic:     (+)neuropathy on gabapentin    Cardiovascular:         METS/Exercise Tolerance:     Hematologic:         Musculoskeletal:   (+) arthritis,  other musculoskeletal-       GI/Hepatic:         Renal/Genitourinary:         Endo:     (+) type II DM thyroid problem hypothyroidism, .      Psychiatric:         Infectious Disease:         Malignancy:         Other:                          Physical Exam  Normal systems: dental    Airway   Mallampati: I  TM distance: >3 FB  Neck ROM: full    Dental     Cardiovascular   Rhythm and rate: regular and normal      Pulmonary    breath sounds clear to auscultation            No results found for: WBC, HGB, HCT, PLT, CRP, SED, NA, POTASSIUM, CHLORIDE, CO2,  "BUN, CR, GLC, KIMBERLY, PHOS, MAG, ALBUMIN, PROTTOTAL, ALT, AST, GGT, ALKPHOS, BILITOTAL, BILIDIRECT, LIPASE, AMYLASE, KIARA, PTT, INR, FIBR, TSH, T4, T3, HCG, HCGS, CKTOTAL, CKMB, TROPN    Preop Vitals  BP Readings from Last 3 Encounters:   07/22/19 160/57   06/30/17 138/59   10/07/13 123/71    Pulse Readings from Last 3 Encounters:   No data found for Pulse      Resp Readings from Last 3 Encounters:   07/22/19 16   06/30/17 16   10/07/13 16    SpO2 Readings from Last 3 Encounters:   07/22/19 99%   06/30/17 97%   10/07/13 100%      Temp Readings from Last 1 Encounters:   07/22/19 36.1  C (97  F) (Oral)    Ht Readings from Last 1 Encounters:   07/22/19 1.626 m (5' 4\")      Wt Readings from Last 1 Encounters:   07/22/19 62.6 kg (138 lb)    Estimated body mass index is 23.69 kg/m  as calculated from the following:    Height as of this encounter: 1.626 m (5' 4\").    Weight as of this encounter: 62.6 kg (138 lb).       Anesthesia Plan      History & Physical Review  History and physical reviewed and following examination; no interval change.    ASA Status:  2 .    NPO Status:  > 8 hours    Plan for General and ETT with Intravenous and Propofol induction. Maintenance will be Balanced.    PONV prophylaxis:  Ondansetron (or other 5HT-3) and Dexamethasone or Solumedrol  Additional equipment: 2nd IV Propofol gtt  hydromorphone      Postoperative Care  Postoperative pain management:  IV analgesics and Oral pain medications.      Consents  Anesthetic plan, risks, benefits and alternatives discussed with:  Patient..                 Acosta Jorge MD  "

## 2019-07-22 NOTE — ANESTHESIA CARE TRANSFER NOTE
Patient: Sabrina Burrell    Procedure(s):  ROBOTIC ASSISTED XI SACROCOLPOPEXY WITH MESH IMPLANT, CYSTOSCOPY, AND LYSIS OF ADHESIONS.    Diagnosis: GENITAL PROLAPSE  Diagnosis Additional Information: No value filed.    Anesthesia Type:   General, ETT     Note:  Airway :Face Mask  Patient transferred to:PACU  Comments: Patient stable, extubated, and transferred to PACU. VS stable and report given to RN. Handoff Report: Identifed the Patient, Identified the Reponsible Provider, Reviewed the pertinent medical history, Discussed the surgical course, Reviewed Intra-OP anesthesia mangement and issues during anesthesia, Set expectations for post-procedure period and Allowed opportunity for questions and acknowledgement of understanding      Vitals: (Last set prior to Anesthesia Care Transfer)    CRNA VITALS  7/22/2019 1624 - 7/22/2019 1654      7/22/2019             Resp Rate (set):  10                Electronically Signed By: RANULFO Gilliam CRNA  July 22, 2019  4:54 PM

## 2019-07-22 NOTE — PROGRESS NOTES
Admission medication history interview status for the 7/22/2019  admission is complete. See EPIC admission navigator for prior to admission medications     Medication history source reliability:Moderate    Medication history interview source(s):Patient    Medication history resources (including written lists, pill bottles, clinic record):None    [facility]    Additional medication history information not noted on PTA med list :None    Time spent in this activity: 45 minutes    Prior to Admission medications    Medication Sig Last Dose Taking? Auth Provider   acetaminophen (TYLENOL) 325 MG tablet Take 650 mg by mouth daily as needed for mild pain 7/19/2019 at PRN Yes Reported, Patient   allopurinol (ZYLOPRIM) 300 MG tablet Take 300 mg by mouth  7/21/2019 at AM Yes Reported, Patient   Ascorbic Acid (VITAMIN C PO) Take 1 tablet by mouth daily  7/21/2019 at AM Yes Reported, Patient   aspirin 81 MG EC tablet Take 81 mg by mouth daily 7/8/2019 at AM Yes Reported, Patient   cetirizine (ZYRTEC) 10 MG tablet Take 10 mg by mouth At Bedtime  7/20/2019 at HS Yes Reported, Patient   co-enzyme Q-10 100 MG CAPS capsule Take 100 mg by mouth daily 7/21/2019 at AM Yes Reported, Patient   diclofenac (VOLTAREN) 1 % topical gel Place onto the skin 4 times daily as needed  Past Month at PRN Yes Reported, Patient   estradiol-norethindrone (ACTIVELLA) 1-0.5 MG tablet Take 1 tablet by mouth daily 7/22/2019 at 0300 Yes Reported, Patient   ezetimibe (ZETIA) 10 MG tablet Take 10 mg by mouth At Bedtime  7/20/2019 at HS Yes Reported, Patient   Fish Oil-Krill Oil (KRILL OIL PLUS PO) Take 1 capsule by mouth daily  7/8/2019 at AM Yes Reported, Patient   fluocinonide (LIDEX) 0.05 % external solution Apply topically 2 times daily as needed 7/15/2019 at PRN Yes Reported, Patient   gabapentin (NEURONTIN) 300 MG capsule Take 600 mg by mouth At Bedtime (2 x 300 mg = 600 mg dose) 7/21/2019 at HS Yes Reported, Patient   hydrocortisone  (WESTCORT) 0.2 % external cream Apply topically 2 times daily as needed Past Month at PRN Yes Reported, Patient   levothyroxine (SYNTHROID,LEVOTHROID) 100 MCG tablet Take 100 mcg by mouth See Admin Instructions Six day weekly Tuesday, Wednesday, Thursday, Friday, Saturday and Sunday. (takes 1/2 tablet 50 mcg on Mondays) 7/21/2019 at AM Yes Reported, Patient   levothyroxine (SYNTHROID/LEVOTHROID) 100 MCG tablet Take 50 mcg by mouth once a week (0.5 x 100 mcg = 50 mcg dose) on Mondays 7/22/2019 at 0300 Yes Reported, Patient   lisinopril (ZESTRIL) 10 MG tablet Take 10 mg by mouth At Bedtime  7/20/2019 at HS Yes Reported, Patient   metFORMIN (GLUCOPHAGE-XR) 500 MG 24 hr tablet Take 500 mg by mouth 2 times daily  7/21/2019 at AM Yes Reported, Patient   polyethylene glycol-propylene glycol (SYSTANE ULTRA) 0.4-0.3 % SOLN ophthalmic solution Place 1 drop into both eyes daily as needed for dry eyes 7/21/2019 at AM Yes Reported, Patient   Probiotic Product (PROBIOTIC & ACIDOPHILUS EX ST PO) Take 1 capsule by mouth daily  7/21/2019 at AM Yes Reported, Patient   solifenacin (VESICARE) 10 MG tablet Take 5-10 mg by mouth daily as needed  More than a month at PRN Yes Reported, Patient   triamcinolone (KENALOG) 0.1 % cream Apply topically daily as needed  Past Week at PRN Yes Reported, Patient   vitamin D3 (VITAMIN D3) 1000 units (25 mcg) tablet Take 1,000 Units by mouth daily  7/21/2019 at AM Yes Reported, Patient

## 2019-07-22 NOTE — OP NOTE
Procedure Date: 07/22/2019      PREOPERATIVE DIAGNOSIS:  Cystocele, grade 3.      POSTOPERATIVE DIAGNOSIS:  Cystocele, grade 3.      PROCEDURES PERFORMED:     1.  Robotically assisted sacrocolpopexy.     2.  Laparoscopic and robotic lysis of adhesions.   3.  Cystoscopy.      SURGEON:  Barrie Oro MD.       ASSISTANT:  Lillie Rg PA-C.       ESTIMATED BLOOD LOSS:  10 mL.      COMPLICATIONS:  None.      ANTIBIOTICS:  Preoperatively, she received antibiotics.      INDICATIONS FOR PROCEDURE:  Sabrina is a 78-year-old female with a history of symptomatic pelvic organ prolapse who was evaluated with urodynamic testing with a pessary in place and was found not to have any stress incontinence.  Of note, the patient does have overactive bladder symptoms with urgency and urge incontinence for which she has InterStim which has been working quite well for her.  I consented her for the robotically-assisted sacrocolpopexy after giving her some other options including pessary.  She understands risks of the procedure including bleeding, infection, injury, mesh erosion, dyspareunia, de katerin stress incontinence, small bowel perforation, injury.  She would like to proceed.  Of note, she also received FDA warning regarding all vaginally placed meshes.      DETAILS OF THE PROCEDURE:  Sabrina was brought to the operating room, placed in supine position.  After excellent introduction of general anesthesia, her perineum was prepped and draped in the regular fashion.  A 16-Bulgarian Birch catheter was placed.  Midline incision was made above the umbilicus and peritoneum was insufflated to a pressure of 15.  Evaluation of the abdominal cavity once I put an 8 mm robotic trocar did demonstrate a significant amount of adhesions in the midline and in the mid abdomen over the abdominal wall.  I positioned additional 8 mm robotic trocars over the left abdomen and after that, I used the endoscopic katarina and cautery to break down adhesions  in order to put additional ports, i.e., 8 mm port in the right abdomen as well as a 5 mm AirSeal port in the right abdomen.  Most likely these adhesions are related to her prior appendectomy.      After spending 20-30 minutes lysing adhesions, I was able to put all the ports in and the patient was turned to steep Trendelenburg position.  Xi robot was docked in.      I also started robotic dissection of the pelvis by taking adhesions of the sigmoid to the bladder.  Once the sigmoid was mobilized, I opened the peritoneum on top of the sacral promontory and positioned 2-0 Prolene sutures x 2 through anterior longitudinal ligaments on top of it.  The sigmoid mesentery appears to be quite short, but I was able to push it safely to the left exposing the peritoneum, which was opened up all the way to the vaginal cuff.  After that, I performed dissection on the vaginal cuff anteriorly and posteriorly.  I inflated fluid in the bladder, confirming the margins of the bladder.  Peritoneum of the bladder was dissected for future coverage on top of the graft.  I positioned polypropylene type 1 mesh from [employer] kit called Restorelle over anterior and posterior portions of the vagina in a Y-shaped configuration.  I used 2-0 Ethibond x 5 interrupted sutures anteriorly with the addition of 3-0 PDS x 2 anterior sutures posteriorly.  I also used a combination of 2-0 Ethibond x 4 with the addition of 3-0 PDS x 3 posteriorly.  That provided excellent support to the apex of the vagina.  I adjusted tension-free positioning of the graft and sutured the long portion of the Y mesh to already preplaced sutures over the sacrum.  All extra amount of the graft was trimmed off and the peritoneum was closed on top of the graft using 2-0 Vicryl in a running fashion.        The midline incision was closed using 2-0 Vicryl figure-of-eight.  Of note, evaluation of the pelvic area did not demonstrate any residual fallopian tube or ovaries.  All  incisions were closed using Dermabond and Monocryl.  Cystoscopy was performed that demonstrated no bladder injury, no bladder pathology.  Efflux of urine was seen coming from both ureteral orifices.  The patient was transferred to the recovery area with no Birch, with no vaginal packing.      I will keep her overnight given extensive adhesions of the abdomen and pelvis and give her a voiding trial.         LOLITA LOO MD             D: 2019   T: 2019   MT:       Name:     JENNIFER GHOSH   MRN:      [id number]        Account:        [id number]   :      1940           Procedure Date: 2019      Document: [id number]       cc: Lolita Loo MD

## 2019-07-22 NOTE — ANESTHESIA POSTPROCEDURE EVALUATION
Patient: Sabrina Burrell    Procedure(s):  ROBOTIC ASSISTED XI SACROCOLPOPEXY WITH MESH IMPLANT, CYSTOSCOPY, AND LYSIS OF ADHESIONS.    Diagnosis:GENITAL PROLAPSE  Diagnosis Additional Information: No value filed.    Anesthesia Type:  General, ETT    Note:  Anesthesia Post Evaluation    Patient location during evaluation: PACU  Patient participation: Able to fully participate in evaluation  Level of consciousness: sleepy but conscious  Pain management: adequate  Airway patency: patent  Cardiovascular status: acceptable and stable  Respiratory status: acceptable, spontaneous ventilation, unassisted and nonlabored ventilation  Hydration status: acceptable  PONV: controlled             Last vitals:  Vitals:    07/22/19 1815 07/22/19 1830 07/22/19 1845   BP: 165/70 163/73 168/72   Pulse: 93 92 95   Resp: 10 12 11   Temp: 36.1  C (97  F) 36.1  C (97  F) 36.1  C (97  F)   SpO2: 96% 96% 94%         Electronically Signed By: Bakari Parnell MD  July 22, 2019  6:52 PM

## 2019-07-23 VITALS
SYSTOLIC BLOOD PRESSURE: 155 MMHG | OXYGEN SATURATION: 95 % | HEART RATE: 92 BPM | HEIGHT: 64 IN | RESPIRATION RATE: 18 BRPM | TEMPERATURE: 97.8 F | BODY MASS INDEX: 23.56 KG/M2 | DIASTOLIC BLOOD PRESSURE: 59 MMHG | WEIGHT: 138 LBS

## 2019-07-23 LAB
ANION GAP SERPL CALCULATED.3IONS-SCNC: 4 MMOL/L (ref 3–14)
BUN SERPL-MCNC: 26 MG/DL (ref 7–30)
CALCIUM SERPL-MCNC: 8.3 MG/DL (ref 8.5–10.1)
CHLORIDE SERPL-SCNC: 106 MMOL/L (ref 94–109)
CO2 SERPL-SCNC: 27 MMOL/L (ref 20–32)
CREAT SERPL-MCNC: 1.18 MG/DL (ref 0.52–1.04)
GFR SERPL CREATININE-BSD FRML MDRD: 45 ML/MIN/{1.73_M2}
GLUCOSE BLDC GLUCOMTR-MCNC: 119 MG/DL (ref 70–99)
GLUCOSE BLDC GLUCOMTR-MCNC: 192 MG/DL (ref 70–99)
GLUCOSE BLDC GLUCOMTR-MCNC: 199 MG/DL (ref 70–99)
GLUCOSE SERPL-MCNC: 165 MG/DL (ref 70–99)
POTASSIUM SERPL-SCNC: 4.7 MMOL/L (ref 3.4–5.3)
SODIUM SERPL-SCNC: 137 MMOL/L (ref 133–144)

## 2019-07-23 PROCEDURE — 80048 BASIC METABOLIC PNL TOTAL CA: CPT | Performed by: PHYSICIAN ASSISTANT

## 2019-07-23 PROCEDURE — 25000128 H RX IP 250 OP 636: Performed by: PHYSICIAN ASSISTANT

## 2019-07-23 PROCEDURE — 82962 GLUCOSE BLOOD TEST: CPT

## 2019-07-23 PROCEDURE — 36415 COLL VENOUS BLD VENIPUNCTURE: CPT | Performed by: PHYSICIAN ASSISTANT

## 2019-07-23 PROCEDURE — 99225 ZZC SUBSEQUENT OBSERVATION CARE,LEVEL II: CPT | Performed by: PHYSICIAN ASSISTANT

## 2019-07-23 RX ORDER — ESTRADIOL AND NORETHINDRONE ACETATE 1; .5 MG/1; MG/1
TABLET ORAL
Status: ON HOLD | COMMUNITY
Start: 2019-07-23 | End: 2024-06-24

## 2019-07-23 RX ADMIN — CIPROFLOXACIN 400 MG: 2 INJECTION, SOLUTION INTRAVENOUS at 06:56

## 2019-07-23 RX ADMIN — CEFAZOLIN 1 G: 1 INJECTION, POWDER, FOR SOLUTION INTRAMUSCULAR; INTRAVENOUS at 14:26

## 2019-07-23 RX ADMIN — CEFAZOLIN 1 G: 1 INJECTION, POWDER, FOR SOLUTION INTRAMUSCULAR; INTRAVENOUS at 06:16

## 2019-07-23 RX ADMIN — ONDANSETRON 4 MG: 2 INJECTION INTRAMUSCULAR; INTRAVENOUS at 08:29

## 2019-07-23 NOTE — PROVIDER NOTIFICATION
MD Notification    Notified Person: MD    Notified Person Name: Lillie MARLA     Notification Date/Time: 7/23/2019 9861    Notification Interaction: Paged    Purpose of Notification: PVR >300 mL x2, straight cathed for 600mL    Orders Received: Pt to discharge w/ Birch catheter and follow-up w/ Urology to have it removed.  Comments:

## 2019-07-23 NOTE — PROVIDER NOTIFICATION
MD Notification    Notified Person: MD    Notified Person Name: Dr. Dunlap     Notification Date/Time: 7/22/19, 1950    Notification Interaction: phone    Purpose of Notification: Patient has scheduled Toradol. Questioning if this should be given with a history or CKD.     Orders Received: Get a creatinine now. If creatinine is less than 1.3, Toradol can be given at 15 mg q6h prn. If above 1.3, stop Toradol.     Comments:

## 2019-07-23 NOTE — PLAN OF CARE
A&Ox4. VSS on RA. Saline locked. Tolerating FLD. SBA. C/O nausea early in shift, zofran given, symptoms relieved. Denies pain, n/t, nausea, SOB, dizziness. CMS intact. Voiding, , 422, 190, 460, straight cath for 600mL, Lillie GUTIÉRREZ Urology paged, waiting for instructions. 5 lap sites Dermabond, CDI. Discharge later today.

## 2019-07-23 NOTE — DISCHARGE INSTRUCTIONS
I would recommend holding your oral hormone therapy until your are at baseline functional status and mobility to reduce your risk of developing an blood clot postoperatively.

## 2019-07-23 NOTE — PROGRESS NOTES
[facility]    Medicine Progress Note - Hospitalist Service       Date of Admission:  7/22/2019  Assessment & Plan   Sabrina Burrell is a 78-year-old female with a past medical history significant for chronic kidney disease, hypertension, hyperlipidemia, type 2 diabetes, hypothyroidism and gout who is status post a sacrocolpopexy with lysis of adhesions and cystoscopy for cystocele.  Hospitalist Service was consulted for postoperative medical co-management.     Status post sacrocolpopexy with lysis of adhesions and cystoscopy for a cystocele.  - POD 1  - Primary management is per Urology including DVT prophylaxis and pain control.     Post-op N/V:  - Continue prn antiemetics  - ADAT     Hypertension: PTA regimen includes lisinopril 10 mg/d at HS  - Can resume on discharge this evening    Type 2 diabetes mellitus.  A1c is 6.2.: PTA regimen includes Meformin Xl 500 mg bid  - BG > 150. Did receive dexamethasone pre-op which likely contributing  - Hold Meformin while observation. Continue SSI  - Can resume PTA Metformin if tolerating diet on discharge    CKD, III: Baseline Cr 1.3  - Cr trend 1.09--1.18  - At baseline  - Cautious use of Toradol    Hypothyroidism  - Resume levothyroxine at discharge.     Post Menopausal Syndrome  - Ok to continue topical estrogen  - Would hold estradiol-norethindrone until at baseline mobility post-op         Diet: Advance Diet as Tolerated  Full Liquid Diet    DVT Prophylaxis: Ambulate every shift  Birch Catheter: not present  Code Status: Full Code    Disposition Plan   Expected discharge: Likely discharge per primary service later today if voiding. Agree with discharge, medically stable  Entered: Yuly Nicholson PA-C 07/23/2019, 9:12 AM       The patient's care was discussed with the Bedside Nurse and Patient.    Yuly Nicholson PA-C  Hospitalist Service  [facility]  Hospital    ______________________________________________________________________    Interval History   Doing ok. Having some nausea this am. Has h/o of post-op N/V. Denies chest pain or dizziness. May discharge today if meets post-op goals    Data reviewed today: I reviewed all medications, new labs and imaging results over the last 24 hours. I personally reviewed no images or EKG's today.    Physical Exam   Vital Signs: Temp: 98.1  F (36.7  C) Temp src: Oral BP: 143/54 Pulse: 97 Heart Rate: 96 Resp: 18 SpO2: 94 % O2 Device: Nasal cannula Oxygen Delivery: 1 LPM  Weight: 138 lbs 0 oz  Constitutional: Alert, resting comfortably in NAD  Respiratory: Normal effort, symmetric expansion, no crackles or wheezing  Cardiovascular: RRR no murmurs   GI: Non distended, normal bowels sounds, no tenderness or guarding  MSK: LE without edema. Dorsalis pedis pulse palpated bilaterally.   Skin/Integumen: Clear  Neuro: CN II-XII grossly intact  Psych:  Alert and oriented x 3. Normal affect      Data   Recent Labs   Lab 07/23/19  0559 07/22/19 2028     --    POTASSIUM 4.7  --    CHLORIDE 106  --    CO2 27  --    BUN 26  --    CR 1.18* 1.09*   ANIONGAP 4  --    KIMBERLY 8.3*  --    *  --      No results found for this or any previous visit (from the past 24 hour(s)).

## 2019-07-23 NOTE — PROGRESS NOTES
[facility]    Surgery  Daily Post-Op Note    Assessment & Plan   Procedure(s):  ROBOTIC ASSISTED XI SACROCOLPOPEXY WITH MESH IMPLANT, CYSTOSCOPY, AND LYSIS OF ADHESIONS.   -1 Day Post-Op  Doing well. Creatinine 1.18.    Clean wound without signs of infection.  Normal healing wound.  No immediate surgical complications identified.  No excessive bleeding  Pain well-controlled.    Plan:  - Ambulate  - Full liquid diet  - Pain control measures; Toradol 15mg okay per creatinine level. Please use third line for pain relief.   - Continue post void BUS, if PVR > 300ml, straight cath and record output.  If more than 2 straight caths required, page urology.    - Plan for discharge this afternoon without velazquez if pt demonstrates good UOP  - Follow up coordinated, see AVS  - Appreciate hospitalist assistance    Active Problems:    Cystocele with prolapse      Lillie Rg PA-C  [facility]  Text Page (M-F 8am - 5pm)  After 4pm and on weekends, please call [phone]    Interval History   Doing well.  Continues to improve.  Pain is well-controlled.  No fevers.  Little ambulation yesterday. Required two straight caths yesterday for elevated PVR.      Physical Exam   Temp: 98.1  F (36.7  C) Temp src: Oral BP: 143/54 Pulse: 97 Heart Rate: 96 Resp: 18 SpO2: 94 % O2 Device: Nasal cannula Oxygen Delivery: 1 LPM  Vitals:    07/22/19 1043   Weight: 62.6 kg (138 lb)     Vital Signs with Ranges  Temp:  [96.4  F (35.8  C)-98.1  F (36.7  C)] 98.1  F (36.7  C)  Pulse:  [] 97  Heart Rate:  [] 96  Resp:  [10-21] 18  BP: (135-174)/(50-89) 143/54  SpO2:  [93 %-99 %] 94 %  I/O last 3 completed shifts:  In: 1540 [P.O.:240; I.V.:1300]  Out: 1190 [Urine:1190]    Physical Exam:   Constitutional: Alert, oriented x 3. Drowsy.   HENT: NC/AT  Pulm: Effort normal. Normal breath sounds  Abd: Soft, appropriately tender near incisions, nondistended. No masses or guarding. Wound clean and dry with minimal or no  drainage.  Surrounding skin with minimal erythema.  SKIN: Warm and dry without rashes or lesions  PSYCH: Normal mood and affect. Normal behavior.        Medications     lactated ringers 100 mL/hr at 07/22/19 2017        ceFAZolin  1 g Intravenous Q8H     ciprofloxacin  400 mg Intravenous Q12H     insulin aspart  1-3 Units Subcutaneous TID AC     insulin aspart  1-3 Units Subcutaneous At Bedtime     sodium chloride (PF)  3 mL Intravenous Q8H       Data   Results for orders placed or performed during the hospital encounter of 07/22/19 (from the past 24 hour(s))   Glucose by meter   Result Value Ref Range    Glucose 124 (H) 70 - 99 mg/dL   Creatinine   Result Value Ref Range    Creatinine 1.09 (H) 0.52 - 1.04 mg/dL    GFR Estimate 49 (L) >60 mL/min/[1.73_m2]    GFR Estimate If Black 57 (L) >60 mL/min/[1.73_m2]   Glucose by meter   Result Value Ref Range    Glucose 222 (H) 70 - 99 mg/dL   Glucose by meter   Result Value Ref Range    Glucose 199 (H) 70 - 99 mg/dL   Basic metabolic panel   Result Value Ref Range    Sodium 137 133 - 144 mmol/L    Potassium 4.7 3.4 - 5.3 mmol/L    Chloride 106 94 - 109 mmol/L    Carbon Dioxide 27 20 - 32 mmol/L    Anion Gap 4 3 - 14 mmol/L    Glucose 165 (H) 70 - 99 mg/dL    Urea Nitrogen 26 7 - 30 mg/dL    Creatinine 1.18 (H) 0.52 - 1.04 mg/dL    GFR Estimate 45 (L) >60 mL/min/[1.73_m2]    GFR Estimate If Black 52 (L) >60 mL/min/[1.73_m2]    Calcium 8.3 (L) 8.5 - 10.1 mg/dL

## 2019-07-23 NOTE — CONSULTS
Consult Date:  07/22/2019      REQUESTING PROVIDER:  Dr. Oro.      REASON FOR CONSULTATION:  Medical comanagement after surgery.      HISTORY OBTAINED:  History obtained from the patient and her daughter who is present at bedside.      HISTORY OF PRESENT ILLNESS:  Sabrina Burrell is a 78-year-old female with a past medical history significant for chronic kidney disease, hypertension, hyperlipidemia, hypothyroidism, gout and type 2 diabetes who is postoperative day 0 status post robotic-assisted sacrocolpopexy with lysis of adhesions and cystoscopy for cystocele.  Hospitalist Service was consulted for postoperative medical comanagement.  The procedure was performed by Dr. Oro under general anesthesia with an estimated blood loss of 10 mL.  The patient is presently evaluated in her room on the observation unit.  She reports she has had some intermittent nausea and dry heaves since surgery, which improved a bit with Zofran.  She has been a little sleepy since surgery as well, but is easily arousable.  She denies chest pain, shortness of breath, dizziness, visual changes, focal weakness.  She has not had much by mouth yet due to nausea.  She has no other concerns at this time.      REVIEW OF SYSTEMS:  A 10-point review of systems was conducted and is negative aside from that mentioned in the HPI.      PAST MEDICAL HISTORY:   1.  CKD.  Baseline creatinine is around 1.3 to 1.4.   2.  Hypertension.   3.  Hyperlipidemia.   4.  Type 2 diabetes mellitus, last hemoglobin A1c on 07/01/2019 was 6.2.  She is on metformin PTA.   5.  Hypothyroidism.   6.  History of gout.   7.  Hypertension.      PAST SURGICAL HISTORY:    Past Surgical History:   Procedure Laterality Date     APPENDECTOMY       GENITOURINARY SURGERY      INTERSTIM     GYN SURGERY      D AND C     IMPLANT STIMULATOR AND LEADS SACRAL NERVE (STAGE ONE AND TWO) N/A 6/30/2017    Procedure: IMPLANT STIMULATOR AND LEADS SACRAL NERVE (STAGE ONE AND TWO);   INTERSTIM STAGE ONE AND TWO PLACEMENT;  Surgeon: Barrie Oro MD;  Location:  OR     ORTHOPEDIC SURGERY  2007    LEFT KNEE REPLACEMENT     PHACOEMULSIFICATION WITH STANDARD INTRAOCULAR LENS IMPLANT  10/7/2013    Procedure: PHACOEMULSIFICATION WITH STANDARD INTRAOCULAR LENS IMPLANT;  Right Kelman Phacoemulsification with Intraocular Lens Implant;  Surgeon: Tian Yates MD;  Location: [facility] OR           ALLERGIES:  NO KNOWN DRUG ALLERGIES.      PRIOR TO ADMISSION MEDICATIONS:    Medications Prior to Admission   Medication Sig Dispense Refill Last Dose     acetaminophen (TYLENOL) 325 MG tablet Take 650 mg by mouth daily as needed for mild pain   7/19/2019 at PRN     allopurinol (ZYLOPRIM) 300 MG tablet Take 300 mg by mouth    7/21/2019 at AM     Ascorbic Acid (VITAMIN C PO) Take 1 tablet by mouth daily    7/21/2019 at AM     cetirizine (ZYRTEC) 10 MG tablet Take 10 mg by mouth At Bedtime    7/20/2019 at HS     co-enzyme Q-10 100 MG CAPS capsule Take 100 mg by mouth daily   7/21/2019 at AM     diclofenac (VOLTAREN) 1 % topical gel Place onto the skin 4 times daily as needed    Past Month at PRN     ezetimibe (ZETIA) 10 MG tablet Take 10 mg by mouth At Bedtime    7/20/2019 at HS     Fish Oil-Krill Oil (KRILL OIL PLUS PO) Take 1 capsule by mouth daily    7/8/2019 at AM     fluocinonide (LIDEX) 0.05 % external solution Apply topically 2 times daily as needed   7/15/2019 at PRN     gabapentin (NEURONTIN) 300 MG capsule Take 600 mg by mouth At Bedtime (2 x 300 mg = 600 mg dose)   7/21/2019 at HS     hydrocortisone (WESTCORT) 0.2 % external cream Apply topically 2 times daily as needed   Past Month at PRN     levothyroxine (SYNTHROID,LEVOTHROID) 100 MCG tablet Take 100 mcg by mouth See Admin Instructions Six day weekly Tuesday, Wednesday, Thursday, Friday, Saturday and Sunday. (takes 1/2 tablet 50 mcg on Mondays)   7/21/2019 at AM     levothyroxine (SYNTHROID/LEVOTHROID) 100 MCG tablet Take 50 mcg by mouth once  a week (0.5 x 100 mcg = 50 mcg dose) on Mondays 7/22/2019 at 0300     lisinopril (ZESTRIL) 10 MG tablet Take 10 mg by mouth At Bedtime    7/20/2019 at HS     metFORMIN (GLUCOPHAGE-XR) 500 MG 24 hr tablet Take 500 mg by mouth 2 times daily    7/21/2019 at AM     polyethylene glycol-propylene glycol (SYSTANE ULTRA) 0.4-0.3 % SOLN ophthalmic solution Place 1 drop into both eyes daily as needed for dry eyes   7/21/2019 at AM     Probiotic Product (PROBIOTIC & ACIDOPHILUS EX ST PO) Take 1 capsule by mouth daily    7/21/2019 at AM     solifenacin (VESICARE) 10 MG tablet Take 5-10 mg by mouth daily as needed    More than a month at PRN     triamcinolone (KENALOG) 0.1 % cream Apply topically daily as needed    Past Week at PRN     vitamin D3 (VITAMIN D3) 1000 units (25 mcg) tablet Take 1,000 Units by mouth daily    7/21/2019 at AM        FAMILY HISTORY:  Reviewed and noncontributory.      SOCIAL HISTORY:  The patient reports she consumes alcohol approximately 1 day per week.  Denies drug use or smoking history.      LABORATORY EVALUATION:  Blood sugar checked earlier today was 124.  Preoperative creatinine 1.33.      PHYSICAL EXAMINATION:   VITAL SIGNS:  Temperature 97 degrees, heart rate 93, blood pressure 174/69, respiratory rate 13, oxygen saturation is 93% on 2 liters nasal cannula.   GENERAL:  The patient is sleepy, but arouses easily to voice and answers questions appropriately.  She is holding an emesis bag.  She is appropriately conversant.   HEENT:  Pupils are equal and reactive to light, EOMI.   ENT:  Mucous membranes are moist.   CARDIOVASCULAR:  Regular rate and rhythm, no murmurs appreciated.   RESPIRATORY:  Lungs are clear to auscultation in anterior fields, no increased work of breathing or wheezing.   GASTROINTESTINAL:  Hypoactive active bowel sounds.  Abdomen is soft and nondistended.  Appropriately tender.  Incisions with Dermabond in place, C/D/I.   MUSCULOSKELETAL:  The patient moves all 4 extremities.    strength is +5/5 and equal bilaterally.   NEUROLOGIC:  Cranial nerves II-XII are grossly intact.  No focal deficits.  Speech is clear.  Face symmetric.      ASSESSMENT AND PLAN:  Sabrina Burrell is a 78-year-old female with a past medical history significant for chronic kidney disease, hypertension, hyperlipidemia, type 2 diabetes, hypothyroidism and gout who is postoperative day 0 status post a sacrocolpopexy with lysis of adhesions and cystoscopy for cystocele.  Hospitalist Service was consulted for postoperative medical co-management.   1.  Postoperative day 0 status post sacrocolpopexy with lysis of adhesions and cystoscopy for a cystocele.  Procedure was performed under general anesthesia with an estimated blood loss of 10 mL.  Postop course has been complicated by some mild nausea.  Plan for the patient to discharge tomorrow if voiding okay.   -- Primary management is per Urology including DVT prophylaxis and pain control.   -- LR at 100 mL per hour.   -- Perioperative Ancef, Cipro has also been ordered q.12 hours after surgery.   -- Ketorolac currently scheduled q.6 hours.  Will ask nursing to page Urology to confirm they want this  given in light of patient's CKD.   -- Check BMP in the a.m.   -- I did advise patient to hold oral estrogen until she is regularly mobile after surgery.   2.  Hypertension.  We will resume prior to admission lisinopril tomorrow if renal function is stable.  P.r.n. labetalol is available for systolic blood pressure greater than 180.   3.  Type 2 diabetes mellitus.  A1c is 6.2.  She is on metformin prior to admission.  We will hold this for now and initiate low dose sliding scale insulin.   4.  Hypothyroidism.  Can resume levothyroxine at discharge.   5.  History of gout.  Can resume allopurinol at discharge, likely tomorrow.   6.  Chronic kidney disease.  Baseline creatinine is around 1.3-1.4.  As above, ask nursing to discuss Toradol use with primary service.  We will check  BMP in the morning.  Recommend avoiding nephrotoxins.      Hospitalist Service will follow up in the a.m.  We appreciate the consultation.      The patient was seen and examined with Dr. Ivelisse Caceres who agrees with the above plan.         IVELISSE CACERES MD       As dictated by KAI KURTZ PA-C            D: 2019   T: 2019   MT: FANNIE      Name:     JENNIFER GHOSH   MRN:      [id number]        Account:       [id number]   :      1940           Consult Date:  2019      Document: [id number]       cc: Barrie Oro MD

## 2019-07-23 NOTE — PLAN OF CARE
A&O. VSS. 1 L NC. Denies pain. IS encouraged, up to 500. CDB. Lap sites CDI. No further nausea. Tolerating a CLD. Denies flatus. Urinary retention overnight. Straight cathed x 1 at 0300. DTV. Up with 1 assist.

## 2020-06-26 ENCOUNTER — THERAPY VISIT (OUTPATIENT)
Dept: PHYSICAL THERAPY | Facility: CLINIC | Age: 80
End: 2020-06-26
Payer: COMMERCIAL

## 2020-06-26 DIAGNOSIS — M54.42 BILATERAL LOW BACK PAIN WITH LEFT-SIDED SCIATICA: Primary | ICD-10-CM

## 2020-06-26 PROCEDURE — 97161 PT EVAL LOW COMPLEX 20 MIN: CPT | Mod: GP | Performed by: PHYSICAL THERAPIST

## 2020-06-26 PROCEDURE — 97110 THERAPEUTIC EXERCISES: CPT | Mod: GP | Performed by: PHYSICAL THERAPIST

## 2020-06-26 NOTE — PROGRESS NOTES
[facility] Initial Evaluation  Subjective:    Therapist Generated HPI Evaluation  Problem details: Patient reports the onset of low back pain in March 2020 without a mechanism of injury change in daily routine. Patient reports falling in her bathtub 6 weeks ago and landed on her buttocks and this exacerbated her low back pain. Patient does reports having similar back pain several years ago and it resolved with physical therapy. Patient reports seeing chiropractor 1-2x per week..         Type of problem:  Lumbar.    This is a recurrent condition.  Condition occurred with:  Insidious onset.  Where condition occurred: for unknown reasons.  Patient reports pain:  Lower lumbar spine, lumbar spine right and lumbar spine left.  Pain is described as aching and is constant.  Pain radiates to:  Thigh left and gluteals left. Pain is worse during the day.  Since onset symptoms are unchanged.  Associated symptoms:  Loss of strength and loss of motion/stiffness. Symptoms are exacerbated by bending, sitting and lifting  and relieved by heat (walking).  Special tests included:  X-ray (taken outside of [facility] system).  Previous treatment includes physical therapy. There was significant improvement following previous treatment.  Restrictions due to condition include:  Working in normal job without restrictions.  Barriers include:  None as reported by patient.                        Objective:  Standing Alignment:    Cervical/Thoracic:  Forward head  Shoulder/UE:  Rounded shoulders  Lumbar:  Convex scoliosis R                           Lumbar/SI Evaluation  ROM:    AROM Lumbar:   Flexion:            Finger tips to mid lower leg, painful  Ext:                    Significant limitation   Side Bend:        Left:  Finger tips to mid thigh, painful    Right:  Finger tips to knee  Rotation:           Left:  WNL    Right:  WNL  Side Glide:        Left:     Right:           Lumbar Myotomes:  Lumbar myotomes: grossly 4+/5  bilaterally.                  Neural Tension/Mobility:      Right side:   SLR positive.  Lumbar Palpation:    Tenderness present at Left:    Vertebral  Tenderness present at Right: Piriformis; Gluteus Medius and Vertebral      Spinal Segmental Conclusions:     Level: Hypo noted at L3, L5 and L4      SI joint/Sacrum:    unremarkable                                                         Siddhartha Lumbar Evaluation    Posture:  Sitting: poor  Standing: poor    Lateral Shift: right  Correction of Posture: better      Test Movements:          SGIS R: During: decreases  After: no effect  Mechanical Response: no effect  Repeat SGIS R: During: abolishes  After: better  Mechanical Response: IncROM  SGIS L: During: increases  After: worse  Mechanical Response: no effect  Repeat SGIS L: During: produces  After: worse  Mechanical Response: no effect                                             ROS    Assessment/Plan:    Patient is a 79 year old female with lumbar complaints.    Patient has the following significant findings with corresponding treatment plan.                Diagnosis 1:  Low back pain  Pain -  hot/cold therapy, self management, education, directional preference exercise and home program  Decreased ROM/flexibility - manual therapy, therapeutic exercise, therapeutic activity and home program  Decreased joint mobility - manual therapy, therapeutic exercise, therapeutic activity and home program  Decreased strength - therapeutic exercise, therapeutic activities and home program  Impaired gait - home program  Decreased function - therapeutic activities and home program  Impaired posture - neuro re-education, therapeutic activities and home program    Therapy Evaluation Codes:   1) History comprised of:   Personal factors that impact the plan of care:      None.    Comorbidity factors that impact the plan of care are:      None.     Medications impacting care: None.  2) Examination of Body Systems comprised of:   Body  structures and functions that impact the plan of care:      Lumbar spine.   Activity limitations that impact the plan of care are:      Bathing, Dressing, Lifting and Sitting.  3) Clinical presentation characteristics are:   Stable/Uncomplicated.  4) Decision-Making    Low complexity using standardized patient assessment instrument and/or measureable assessment of functional outcome.  Cumulative Therapy Evaluation is: Low complexity.    Previous and current functional limitations:  (See Goal Flow Sheet for this information)    Short term and Long term goals: (See Goal Flow Sheet for this information)     Communication ability:  Patient appears to be able to clearly communicate and understand verbal and written communication and follow directions correctly.  Treatment Explanation - The following has been discussed with the patient:   RX ordered/plan of care  Anticipated outcomes  Possible risks and side effects  This patient would benefit from PT intervention to resume normal activities.   Rehab potential is good.    Frequency:  1 X week, once daily  Duration:  for 6 weeks  Discharge Plan:  Achieve all LTG.  Independent in home treatment program.  Reach maximal therapeutic benefit.    Please refer to the daily flowsheet for treatment today, total treatment time and time spent performing 1:1 timed codes.

## 2020-06-26 NOTE — LETTER
LESVIA BRIAN PT  [address]  [address]  [place] [address]  [phone]    2020    Re: Sabrina CARVALHO Indra   :   1940  MRN:  [id number]   REFERRING PHYSICIAN:   MD LESVIA Alanis PT  Date of Initial Evaluation:  2020  Visits:  Rxs Used: 1  Reason for Referral:  Bilateral low back pain with left-sided sciatica    EVALUATION SUMMARY    [facility] Initial Evaluation  Subjective:   Therapist Generated HPI Evaluation  Problem details: Patient reports the onset of low back pain in 2020 without a mechanism of injury change in daily routine. Patient reports falling in her bathtub 6 weeks ago and landed on her buttocks and this exacerbated her low back pain. Patient does reports having similar back pain several years ago and it resolved with physical therapy. Patient reports seeing chiropractor 1-2x per week..         Type of problem:  Lumbar.    This is a recurrent condition.  Condition occurred with:  Insidious onset.  Where condition occurred: for unknown reasons.  Patient reports pain:  Lower lumbar spine, lumbar spine right and lumbar spine left.  Pain is described as aching and is constant.  Pain radiates to:  Thigh left and gluteals left. Pain is worse during the day.  Since onset symptoms are unchanged.  Associated symptoms:  Loss of strength and loss of motion/stiffness. Symptoms are exacerbated by bending, sitting and lifting  and relieved by heat (walking).  Special tests included:  X-ray (taken outside of [facility] system).  Previous treatment includes physical therapy. There was significant improvement following previous treatment.  Restrictions due to condition include:  Working in normal job without restrictions.  Barriers include:  None as reported by patient.    Objective:  Standing Alignment:    Cervical/Thoracic:  Forward head  Shoulder/UE:  Rounded shoulders  Lumbar:  Convex scoliosis R          Re: Sabrina DEVEN Indra   :   1940        Lumbar/SI Evaluation  ROM:    AROM Lumbar:   Flexion:            Finger tips to mid lower leg, painful  Ext:                    Significant limitation   Side Bend:        Left:  Finger tips to mid thigh, painful    Right:  Finger tips to knee  Rotation:           Left:  WNL    Right:  WNL  Side Glide:        Left:     Right:         Lumbar Myotomes:  Lumbar myotomes: grossly 4+/5 bilaterally.    Neural Tension/Mobility:    Right side:   SLR positive.    Lumbar Palpation:    Tenderness present at Left:    Vertebral  Tenderness present at Right: Piriformis; Gluteus Medius and Vertebral    Spinal Segmental Conclusions:   Level: Hypo noted at L3, L5 and L4    SI joint/Sacrum:    unremarkable    Siddhartha Lumbar Evaluation  Posture:  Sitting: poor  Standing: poor  Lateral Shift: right  Correction of Posture: better    Test Movements:  SGIS R: During: decreases  After: no effect  Mechanical Response: no effect  Repeat SGIS R: During: abolishes  After: better  Mechanical Response: IncROM  SGIS L: During: increases  After: worse  Mechanical Response: no effect  Repeat SGIS L: During: produces  After: worse  Mechanical Response: no effect    Assessment/Plan:    Patient is a 79 year old female with lumbar complaints.    Patient has the following significant findings with corresponding treatment plan.                Diagnosis 1:  Low back pain  Pain -  hot/cold therapy, self management, education, directional preference exercise and home program  Decreased ROM/flexibility - manual therapy, therapeutic exercise, therapeutic activity and home program  Decreased joint mobility - manual therapy, therapeutic exercise, therapeutic activity and home program  Decreased strength - therapeutic exercise, therapeutic activities and home program  Impaired gait - home program  Decreased function - therapeutic activities and home program  Impaired posture - neuro re-education, therapeutic activities and home program    Therapy Evaluation  Codes:   1) History comprised of:   Personal factors that impact the plan of care:      None.    Comorbidity factors that impact the plan of care are:      None.     Medications impacting care: None.  2) Examination of Body Systems comprised of:   Body structures and functions that impact the plan of care:      Lumbar spine.   Activity limitations that impact the plan of care are:      Bathing, Dressing, Lifting and Sitting.  3) Clinical presentation characteristics are:   Stable/Uncomplicated.  4) Decision-Making    Low complexity using standardized patient assessment instrument and/or measureable assessment of functional outcome.  Cumulative Therapy Evaluation is: Low complexity.    Previous and current functional limitations:  (See Goal Flow Sheet for this information)    Short term and Long term goals: (See Goal Flow Sheet for this information)     Communication ability:  Patient appears to be able to clearly communicate and understand verbal and written communication and follow directions correctly.  Treatment Explanation - The following has been discussed with the patient:   RX ordered/plan of care  Anticipated outcomes  Possible risks and side effects  This patient would benefit from PT intervention to resume normal activities.   Rehab potential is good.    Frequency:  1 X week, once daily  Duration:  for 6 weeks  Discharge Plan:  Achieve all LTG.  Independent in home treatment program.  Reach maximal therapeutic benefit.    Please refer to the daily flowsheet for treatment today, total treatment time and time spent performing 1:1 timed codes.         Thank you for your referral.    INQUIRIES  Therapist: LAURA Oshea PT  [address]  [address]  [place] [address]  Phone: [phone]  Fax: [phone]

## 2020-06-26 NOTE — PROGRESS NOTES
[facility] Initial Evaluation  Subjective:    Patient Health History           General health as reported by patient is good.  Pertinent medical history includes: diabetes, implanted device and thyroid problems.   Red flags:  Pain at rest/night.  Medical allergies: none.   Surgeries include:  Orthopedic surgery.    Current medications:  Hormone replacement therapy.    Current occupation is Retired.                                       Objective:  System    Physical Exam    General     ROS    Assessment/Plan:

## 2020-07-07 ENCOUNTER — THERAPY VISIT (OUTPATIENT)
Dept: PHYSICAL THERAPY | Facility: CLINIC | Age: 80
End: 2020-07-07
Payer: COMMERCIAL

## 2020-07-07 DIAGNOSIS — M54.42 BILATERAL LOW BACK PAIN WITH LEFT-SIDED SCIATICA: Primary | ICD-10-CM

## 2020-07-07 PROCEDURE — 97110 THERAPEUTIC EXERCISES: CPT | Mod: GP | Performed by: PHYSICAL THERAPIST

## 2020-07-16 ENCOUNTER — THERAPY VISIT (OUTPATIENT)
Dept: PHYSICAL THERAPY | Facility: CLINIC | Age: 80
End: 2020-07-16
Payer: COMMERCIAL

## 2020-07-16 DIAGNOSIS — M54.42 BILATERAL LOW BACK PAIN WITH LEFT-SIDED SCIATICA: Primary | ICD-10-CM

## 2020-07-16 PROCEDURE — 97110 THERAPEUTIC EXERCISES: CPT | Mod: GP | Performed by: PHYSICAL THERAPIST

## 2020-07-30 ENCOUNTER — THERAPY VISIT (OUTPATIENT)
Dept: PHYSICAL THERAPY | Facility: CLINIC | Age: 80
End: 2020-07-30
Payer: COMMERCIAL

## 2020-07-30 DIAGNOSIS — M54.42 BILATERAL LOW BACK PAIN WITH LEFT-SIDED SCIATICA: Primary | ICD-10-CM

## 2020-07-30 PROCEDURE — 97110 THERAPEUTIC EXERCISES: CPT | Mod: GP | Performed by: PHYSICAL THERAPIST

## 2020-07-30 NOTE — PROGRESS NOTES
Subjective:  HPI  Physical Exam                    Objective:  System    Physical Exam    General     ROS    Assessment/Plan:    DISCHARGE REPORT    Progress reporting period is from 6/26/2020 to 7/30/2020.       SUBJECTIVE  Subjective changes noted by patient:   Subjective: Pateint reports her back and leg feel significantly better and she says she is managing her condition well. She notes that the exercises help to reduce her pain.    Current pain level is  Current Pain level: 0/10.     Previous pain level was   Initial Pain level: 6/10.   Changes in function:  Yes (See Goal flowsheet attached for changes in current functional level)  Adverse reaction to treatment or activity: None    OBJECTIVE  Changes noted in objective findings:  Yes,   Objective: tenderness over left glut med tendon. Unremarkable lumbar spine screen. No pain reported today      ASSESSMENT/PLAN  Updated problem list and treatment plan: Diagnosis 1:  Low back pain    STG/LTGs have been met or progress has been made towards goals:  Yes (See Goal flow sheet completed today.)  Assessment of Progress: The patient's condition is improving.  The patient has met all of their long term goals.  Self Management Plans:  Patient is independent in a home treatment program.  Patient is independent in self management of symptoms.  I have re-evaluated this patient and find that the nature, scope, duration and intensity of the therapy is appropriate for the medical condition of the patient.  Sabrina continues to require the following intervention to meet STG and LTG's:  PT intervention is no longer required to meet STG/LTG.    Recommendations:  This patient is ready to be discharged from therapy and continue their home treatment program.    Please refer to the daily flowsheet for treatment today, total treatment time and time spent performing 1:1 timed codes.

## 2022-02-09 ENCOUNTER — THERAPY VISIT (OUTPATIENT)
Dept: PHYSICAL THERAPY | Facility: CLINIC | Age: 82
End: 2022-02-09
Payer: COMMERCIAL

## 2022-02-09 DIAGNOSIS — M54.50 CHRONIC BILATERAL LOW BACK PAIN WITHOUT SCIATICA: Primary | ICD-10-CM

## 2022-02-09 DIAGNOSIS — G89.29 CHRONIC BILATERAL LOW BACK PAIN WITHOUT SCIATICA: Primary | ICD-10-CM

## 2022-02-09 PROCEDURE — 97112 NEUROMUSCULAR REEDUCATION: CPT | Mod: GP | Performed by: PHYSICAL THERAPIST

## 2022-02-09 PROCEDURE — 97110 THERAPEUTIC EXERCISES: CPT | Mod: GP | Performed by: PHYSICAL THERAPIST

## 2022-02-09 PROCEDURE — 97161 PT EVAL LOW COMPLEX 20 MIN: CPT | Mod: GP | Performed by: PHYSICAL THERAPIST

## 2022-02-09 NOTE — PROGRESS NOTES
[facility] Initial Evaluation -- Lumbar    Date: February 9, 2022  Sabrina Burrell is a 81 year old female with a Lumbar condition.   Referral: FP  Work mechanical stresses:  Retired  Employment status:    Leisure mechanical stresses: Not a regular exerciser - did join gym but pain started and has not been going  Functional disability score (SEKOU/STarT Back):  See Flowsheet  VAS score (0-10): 5/10, ranges 0-10/10  Patient goals/expectations:  Be able to sleep in bed    HISTORY:    Present symptoms: L>R LB and buttocks.  Denies radiation to legs  Pain quality (sharp/shooting/stabbing/aching/burning/cramping):  Sharp, ache  Paresthesia (yes/no):  no    Present since (onset date): 2 1/2 weeks ago,  MD referral 2-3-2022.     Symptoms (improving/unchanging/worsening):  Slight improved.     Symptoms commenced as a result of: unknwon   Condition occurred in the following environment:   Unit(s) known     Symptoms at onset (back/thigh/leg): back  Constant symptoms (back/thigh/leg):   Intermittent symptoms (back/thigh/leg): Back and buttock    Symptoms are made worse with the following: Always Rising 6/10, Sometimes Standing 10-15 min limit - weakness, Always Lying and Time of day - No effect   Symptoms are made better with the following: Other - Heat and cold    Disturbed sleep (yes/no):  Yes - sleeping mostly in recliner Sleeping postures (prone/sup/side R/L): R SL since onset of pain, L SL prior to onset of pain    Previous episodes (0/1-5/6-10/11+): 3+ Year of first episode:     Previous history: unknown  Previous treatments: PT - relief, Chiro - decr pain the next day for a day or two.      Specific Questions:  Cough/Sneeze/Strain (pos/neg): pos  Bowel/Bladder (normal/abnormal): Has hx of B&B control issues  Gait (normal/abnormal): normal  Medications (nil/NSAIDS/analg/steroids/anticoag/other):  Other - Hormone replacement, Thyroid and Diabetes  Medical allergies:  Sensitive Latex and  adhesives  General health (excellent/good/fair/poor):  good  Pertinent medical history:  Diabetes, Implanted device and Thyroid problems  Imaging (None/Xray/MRI/Other):  None  Recent or major surgery (yes/no):  TKR 5+ yrs ago,  Night pain (yes/no): Yes but can get comfortable in recliner  Accidents (yes/no): no  Unexplained weight loss (yes/no): no  Barriers at home: no  Other red flags: Pain at rest    EXAMINATION    Posture:   Sitting (good/fair/poor): poor-fair  Standing (good/fair/poor):fair  Lordosis (red/acc/normal): reduced  Correction of posture (better/worse/no effect): Better    Lateral Shift (right/left/nil): R shld shift  Relevant (yes/no):  No? - has had for years  Other Observations:     Neurological:    Motor deficit:  NA - check next session  Reflexes:  NA  Sensory deficit:  NA  Dural signs:  Seated SLR w/ DF-R (-), L - incr LB and buttock pain    Movement Loss:   Jose Mod Min Nil Pain   Flexion    X LBP   Extension X    Pulling L lat hip   Side Gliding R        X    Mild LBP   Side Gliding L    X Mod LBP     Test Movements:   During: produces, abolishes, increases, decreases, no effect, centralizing, peripheralizing   After: better, worse, no better, no worse, no effect, centralized, peripheralized    Pretest symptoms standing: Pain L>R LB and L buttock   Symptoms During Symptoms After ROM increased ROM decreased No Effect   FIS        Rep FIS        EIS - hips against table Decreases    No Better         Rep EIS Decreases  LB and hip  Worse  Pain into lat hip      X     If required, pretest symptoms: Pain L>R LB and L buttock   Symptoms During Symptoms After ROM increased ROM decreased No Effect   SGIS - R        Rep SGIS - R        SGIS - L  R shld on wall Increases    No Worse         Rep SGIS - L Increases  LB>L hip  No Worse      X     Static Tests:  Sitting slouched:  NA   Sitting erect:  better  Standing slouched: NA  Standing erect:  NA  Lying prone in extension:  NA Long sitting:   NA    Other Tests:    FISit x5 - Incr LB and bilat hips, W, Incr pain w/ stdg and decr ROM - especially ext    Provisional Classification:  Inconclusive/Other - needs further assessment - Derangement vs Mechanically inconclusive    Principle of Management:  Education:  Posture - Neutral Spine, use of L-roll, affect of posture on spine/pain, no couch, recliner, or propping up on pillows in bed, specificity of exer, centralisation/peripheralisation, and HEP     Equipment provided:  None - pt instructed to start using her L-roll at home w/ sitting  Mechanical therapy (Y/N):  Y??   Extension principle:    Lateral Principle:  SGIS L (R shld on the wall) x10-15 reps 6-8 x/day  Flexion principle:    Other:      ASSESSMENT/PLAN:    Patient is a 81 year old female with lumbar complaints.    Patient has the following significant findings with corresponding treatment plan.                Diagnosis 1:  Chronic L LBP w/o sciatica  Pain -  hot/cold therapy, manual therapy, self management, education, directional preference exercise and home program  Decreased ROM/flexibility - manual therapy, therapeutic exercise and home program  Decreased strength - therapeutic exercise, therapeutic activities and home program  Decreased function - therapeutic activities and home program  Impaired posture - neuro re-education and home program    Therapy Evaluation Codes:   1) Clinical presentation characteristics are:   Stable/Uncomplicated.  2) Decision-Making    Low complexity using standardized patient assessment instrument and/or measureable assessment of functional outcome.  Cumulative Therapy Evaluation is: Low complexity.    Previous and current functional limitations:  (See Goal Flow Sheet for this information)    Short term and Long term goals: (See Goal Flow Sheet for this information)     Communication ability:  Patient appears to be able to clearly communicate and understand verbal and written communication and follow directions  correctly.  Treatment Explanation - The following has been discussed with the patient:   RX ordered/plan of care  Anticipated outcomes  Possible risks and side effects  This patient would benefit from PT intervention to resume normal activities.   Rehab potential is good.    Frequency:  1 X week, once daily  Duration:  for 4 weeks tapering to 2 X a month over 4 weeks  Discharge Plan:  Achieve all LTG.  Independent in home treatment program.  Reach maximal therapeutic benefit.    Please refer to the daily flowsheet for treatment today, total treatment time and time spent performing 1:1 timed codes.

## 2022-02-10 NOTE — PROGRESS NOTES
[facility]    OUTPATIENT Physical Therapy ORTHOPEDIC EVALUATION  PLAN OF TREATMENT FOR OUTPATIENT REHABILITATION  (COMPLETE FOR INITIAL CLAIMS ONLY)  Patient's Last Name, First Name, M.I.  YOB: 1940  Sabrina Burrell    Provider s Name:  [facility]   Medical Record No.  [id number]   Start of Care Date:  02/09/22   Onset Date:    (2 1/2 weeks ago,  MD referral 2-3-2022.   )   Type:     _X__PT   ___OT Medical Diagnosis:    Encounter Diagnosis   Name Primary?     Chronic bilateral low back pain without sciatica Yes        Treatment Diagnosis:  Chronic L LBP w/o sciatica        Goals:     02/09/22 0500   Body Part   Goals listed below are for LB   Goal #1   Goal #1 self cares/transfers/bed mobility   Previous Functional Level No restrictions   Current Functional Level Able    Performance Level to rise after sitting w/ pain 6/10   STG Target Performance Be able to    Performance Level rise after sitting w/ pain 3/10   Rationale for independent living   Due Date 03/09/22   LTG Target Performance Be able to    Performance level rise after sitting w/ pain 1/10 or less   Rationale for independent living   Due Date 04/06/22   Goal #2   Goal #2 sleeping   Previous Functional Level No restrictions   Performance Level Sleeping most of night in recliner d/t pain   Performance Level Be able to sleep up to 3 hours in bed before needing to go to recliner   Rationale to establish restorative sleep pattern   Due Date 03/09/22   Performance level be able to sleep up to 6 hours in bed before needing to go to recliner   Rationale to establish restorative sleep pattern   Due Date 04/06/22       Therapy Frequency:  1 x/week  Predicted Duration of Therapy Intervention:  for 4 weeks tapering to 2 X a month over 4 weeks    Julia Valle, PT                 I CERTIFY THE NEED FOR THESE  SERVICES FURNISHED UNDER        THIS PLAN OF TREATMENT AND WHILE UNDER MY CARE .             Physician Signature               Date    X_____________________________________________________                             Certification Date From:  02/09/22   Certification Date To:  04/09/22    Referring Provider:  Andres Erwin    Initial Assessment        See Epic Evaluation SOC Date: 02/09/22

## 2022-02-16 ENCOUNTER — THERAPY VISIT (OUTPATIENT)
Dept: PHYSICAL THERAPY | Facility: CLINIC | Age: 82
End: 2022-02-16
Payer: COMMERCIAL

## 2022-02-16 DIAGNOSIS — G89.29 CHRONIC BILATERAL LOW BACK PAIN WITHOUT SCIATICA: Primary | ICD-10-CM

## 2022-02-16 DIAGNOSIS — M54.50 CHRONIC BILATERAL LOW BACK PAIN WITHOUT SCIATICA: Primary | ICD-10-CM

## 2022-02-16 PROCEDURE — 97140 MANUAL THERAPY 1/> REGIONS: CPT | Mod: GP | Performed by: PHYSICAL THERAPIST

## 2022-02-16 PROCEDURE — 97110 THERAPEUTIC EXERCISES: CPT | Mod: GP | Performed by: PHYSICAL THERAPIST

## 2022-02-16 PROCEDURE — 97530 THERAPEUTIC ACTIVITIES: CPT | Mod: GP | Performed by: PHYSICAL THERAPIST

## 2022-02-16 NOTE — PROGRESS NOTES
Daily Note:    SUBJECTIVE:  See Flowsheet    OBJECTIVE:  Significant Shld shift R sitting and stdg  Current pain L lat hip and ant thigh  Lumbar AROM:   Flex To toes   Ext Max loss - pain L LB   SG R Min decr - mod pain L lat hip          L Max decr - severe LBP    Seated SLR w/ ankle DF:  R (-), L Pulling soreness L lat hip and LB    Strength:      R     L   HF Strong  Weak   Q Strong  Decr   HS Strong  Decr   AT Strong  Decr    ASSESSMENT/PLAN:  See Flowsheet        Please refer to the daily flowsheet for treatment today, total treatment time and time spent performing 1:1 timed codes.

## 2022-02-23 ENCOUNTER — THERAPY VISIT (OUTPATIENT)
Dept: PHYSICAL THERAPY | Facility: CLINIC | Age: 82
End: 2022-02-23
Payer: COMMERCIAL

## 2022-02-23 DIAGNOSIS — G89.29 CHRONIC BILATERAL LOW BACK PAIN WITHOUT SCIATICA: Primary | ICD-10-CM

## 2022-02-23 DIAGNOSIS — M54.50 CHRONIC BILATERAL LOW BACK PAIN WITHOUT SCIATICA: Primary | ICD-10-CM

## 2022-02-23 PROCEDURE — 97140 MANUAL THERAPY 1/> REGIONS: CPT | Mod: GP | Performed by: PHYSICAL THERAPIST

## 2022-02-23 PROCEDURE — 97530 THERAPEUTIC ACTIVITIES: CPT | Mod: GP | Performed by: PHYSICAL THERAPIST

## 2022-02-23 PROCEDURE — 97110 THERAPEUTIC EXERCISES: CPT | Mod: GP | Performed by: PHYSICAL THERAPIST

## 2022-03-02 ENCOUNTER — THERAPY VISIT (OUTPATIENT)
Dept: PHYSICAL THERAPY | Facility: CLINIC | Age: 82
End: 2022-03-02
Payer: COMMERCIAL

## 2022-03-02 DIAGNOSIS — M54.50 CHRONIC BILATERAL LOW BACK PAIN WITHOUT SCIATICA: Primary | ICD-10-CM

## 2022-03-02 DIAGNOSIS — G89.29 CHRONIC BILATERAL LOW BACK PAIN WITHOUT SCIATICA: Primary | ICD-10-CM

## 2022-03-02 PROCEDURE — 97530 THERAPEUTIC ACTIVITIES: CPT | Mod: GP | Performed by: PHYSICAL THERAPIST

## 2022-03-02 PROCEDURE — 97110 THERAPEUTIC EXERCISES: CPT | Mod: GP | Performed by: PHYSICAL THERAPIST

## 2022-03-02 NOTE — PROGRESS NOTES
Daily Note:    SUBJECTIVE:  See Flowsheet    OBJECTIVE:  Significant Shld shift R sitting and stdg  Currently no back or L leg pain  Lumbar AROM:   Flex To toes   Ext Min-mod loss -no pain   SG R WNL          L Mod decr - Mild L Hip pain  Seated SLR w/ ankle DF:  R (-), L (-)  Strength:      R     L   HF 5/5  5-/5   Q 5/5  5-/5   HS 5/5  5-/5   AT 5/5  4+/5    ASSESSMENT/PLAN:  See Flowsheet        Please refer to the daily flowsheet for treatment today, total treatment time and time spent performing 1:1 timed codes.

## 2023-11-15 ENCOUNTER — THERAPY VISIT (OUTPATIENT)
Dept: PHYSICAL THERAPY | Facility: CLINIC | Age: 83
End: 2023-11-15
Payer: COMMERCIAL

## 2023-11-15 DIAGNOSIS — M75.41 IMPINGEMENT SYNDROME, SHOULDER, RIGHT: Primary | ICD-10-CM

## 2023-11-15 PROCEDURE — 97110 THERAPEUTIC EXERCISES: CPT | Mod: GP | Performed by: PHYSICAL THERAPIST

## 2023-11-15 PROCEDURE — 97140 MANUAL THERAPY 1/> REGIONS: CPT | Mod: GP | Performed by: PHYSICAL THERAPIST

## 2023-11-15 PROCEDURE — 97161 PT EVAL LOW COMPLEX 20 MIN: CPT | Mod: GP | Performed by: PHYSICAL THERAPIST

## 2023-11-15 NOTE — PROGRESS NOTES
PHYSICAL THERAPY EVALUATION  Type of Visit: Evaluation    See electronic medical record for Abuse and Falls Screening details.    Subjective       Presenting condition or subjective complaint: Rt shoulder pain, came on suddenly  Date of onset: 11/09/23    Relevant medical history:   LBP, Left hip OA   Dates & types of surgery:      Prior diagnostic imaging/testing results:       Prior therapy history for the same diagnosis, illness or injury: No      Prior Level of Function  Transfers: Independent  Ambulation: Independent  ADL: Independent  IADL: Driving, Finances, Housekeeping, Laundry, Meal preparation, Medication management    Living Environment  Social support: Alone   Type of home: House   Stairs to enter the home: Yes   Is there a railing: Yes   Ramp: No   Stairs inside the home: Yes       Help at home: Home and Yard maintenance tasks  Equipment owned:       Employment:   RT[employer]  Hobbies/Interests:      Patient goals for therapy: reach, lift, use my Rt arm    Pain assessment: Location: Rt shoulder /Rating: 10/10      Objective   SHOULDER EVALUATION  PAIN: Pain is Exacerbated By: vaccuming  INTEGUMENTARY (edema, incisions):   POSTURE:  poor rounded shoulders, frwrd head/shelf def  GAIT:   Weightbearing Status:   Assistive Device(s):   Gait Deviations:   BALANCE/PROPRIOCEPTION:   WEIGHTBEARING ALIGNMENT:   ROM:     STRENGTH:  RC 2/5 exrot, 4/5 int rot  FLEXIBILITY: WFL  SPECIAL TESTS:  +++bursal sign, +++impingement, ++++AC pain   PALPATION:  +++++TTP suprasp, subscap, pect, scalens  JOINT MOBILITY: WFL  CERVICAL SCREEN: WNL    Assessment & Plan   CLINICAL IMPRESSIONS  Medical Diagnosis: Rt shoulder impingement    Treatment Diagnosis: Rt shldr pain/imp   Impression/Assessment: Patient is a 82 year old female with Rt shldr  complaints.  The following significant findings have been identified: Pain, Decreased ROM/flexibility, Decreased strength, Decreased proprioception, Inflammation, Impaired muscle performance,  Decreased activity tolerance, and Impaired posture. These impairments interfere with their ability to perform self care tasks, recreational activities, household chores, and driving  as compared to previous level of function.   Pt presents w/ severe shoulder impingement Rt. She will benefit from a full course of PT to include manual therapy, theraputic exercise to restore her functional levels.     Clinical Decision Making (Complexity):  Clinical Presentation: Stable/Uncomplicated  Clinical Presentation Rationale: based on medical and personal factors listed in PT evaluation  Clinical Decision Making (Complexity): Low complexity    PLAN OF CARE  Treatment Interventions:  Modalities: Cryotherapy  Interventions: Manual Therapy, Neuromuscular Re-education, Therapeutic Exercise, Self-Care/Home Management    Long Term Goals     PT Goal 1  Goal Identifier: Reach/Lift  Goal Description: Reach to 2nd cupboard and lift a cup w/o pain  Rationale: to maximize safety and independence with performance of ADLs and functional tasks;to maximize safety and independence within the home;to maximize safety and independence with self cares  Target Date: 01/10/24      Frequency of Treatment: weekly up to 12 visits  Duration of Treatment: 8 weeks    Recommended Referrals to Other Professionals: Physical Therapy  Education Assessment:        Risks and benefits of evaluation/treatment have been explained.   Patient/Family/caregiver agrees with Plan of Care.     Evaluation Time:             Signing Clinician: Gennaro Rehman, PT      [facility]                                                                                   OUTPATIENT PHYSICAL THERAPY      PLAN OF TREATMENT FOR OUTPATIENT REHABILITATION   Patient's Last Name, First Name, Sabrina Rodriguez YOB: 1940   Provider's Name   [facility]   Medical Record No.  [id number]     Onset Date: 11/09/23   Start of Care Date: 11/15/23     Medical Diagnosis:  Rt shoulder impingement      PT Treatment Diagnosis:  Rt shldr pain/imp Plan of Treatment  Frequency/Duration: weekly up to 12 visits/ 8 weeks    Certification date from 11/15/23 to  1/10/24       See note for plan of treatment details and functional goals     Gennaro Rehman, PT                         I CERTIFY THE NEED FOR THESE SERVICES FURNISHED UNDER        THIS PLAN OF TREATMENT AND WHILE UNDER MY CARE     (Physician attestation of this document indicates review and certification of the therapy plan).              Referring Provider:  Anam Jiménez    Initial Assessment  See Epic Evaluation- Start of Care Date: 11/15/23

## 2023-11-16 ENCOUNTER — TRANSCRIBE ORDERS (OUTPATIENT)
Dept: OTHER | Age: 83
End: 2023-11-16

## 2023-11-16 DIAGNOSIS — M25.511 RIGHT SHOULDER PAIN: Primary | ICD-10-CM

## 2023-11-16 DIAGNOSIS — M67.919 ROTATOR CUFF DISORDER: ICD-10-CM

## 2023-11-17 ENCOUNTER — THERAPY VISIT (OUTPATIENT)
Dept: PHYSICAL THERAPY | Facility: CLINIC | Age: 83
End: 2023-11-17
Payer: COMMERCIAL

## 2023-11-17 DIAGNOSIS — M75.41 IMPINGEMENT SYNDROME, SHOULDER, RIGHT: Primary | ICD-10-CM

## 2023-11-17 PROCEDURE — 97110 THERAPEUTIC EXERCISES: CPT | Mod: GP | Performed by: PHYSICAL THERAPIST

## 2023-11-17 PROCEDURE — 97140 MANUAL THERAPY 1/> REGIONS: CPT | Mod: GP | Performed by: PHYSICAL THERAPIST

## 2023-11-29 ENCOUNTER — THERAPY VISIT (OUTPATIENT)
Dept: PHYSICAL THERAPY | Facility: CLINIC | Age: 83
End: 2023-11-29
Payer: COMMERCIAL

## 2023-11-29 DIAGNOSIS — M75.41 IMPINGEMENT SYNDROME, SHOULDER, RIGHT: Primary | ICD-10-CM

## 2023-11-29 PROCEDURE — 97140 MANUAL THERAPY 1/> REGIONS: CPT | Mod: GP | Performed by: PHYSICAL THERAPIST

## 2023-11-29 PROCEDURE — 97110 THERAPEUTIC EXERCISES: CPT | Mod: GP | Performed by: PHYSICAL THERAPIST

## 2023-12-07 ENCOUNTER — THERAPY VISIT (OUTPATIENT)
Dept: PHYSICAL THERAPY | Facility: CLINIC | Age: 83
End: 2023-12-07
Payer: COMMERCIAL

## 2023-12-07 DIAGNOSIS — M75.41 IMPINGEMENT SYNDROME, SHOULDER, RIGHT: Primary | ICD-10-CM

## 2023-12-07 PROCEDURE — 97140 MANUAL THERAPY 1/> REGIONS: CPT | Mod: GP | Performed by: PHYSICAL THERAPIST

## 2023-12-12 ENCOUNTER — THERAPY VISIT (OUTPATIENT)
Dept: PHYSICAL THERAPY | Facility: CLINIC | Age: 83
End: 2023-12-12
Payer: COMMERCIAL

## 2023-12-12 DIAGNOSIS — M75.41 IMPINGEMENT SYNDROME, SHOULDER, RIGHT: Primary | ICD-10-CM

## 2023-12-12 PROCEDURE — 97110 THERAPEUTIC EXERCISES: CPT | Mod: GP | Performed by: PHYSICAL THERAPIST

## 2023-12-12 PROCEDURE — 97140 MANUAL THERAPY 1/> REGIONS: CPT | Mod: GP | Performed by: PHYSICAL THERAPIST

## 2023-12-20 ENCOUNTER — THERAPY VISIT (OUTPATIENT)
Dept: PHYSICAL THERAPY | Facility: CLINIC | Age: 83
End: 2023-12-20
Payer: COMMERCIAL

## 2023-12-20 DIAGNOSIS — M75.41 IMPINGEMENT SYNDROME, SHOULDER, RIGHT: Primary | ICD-10-CM

## 2023-12-20 PROCEDURE — 97110 THERAPEUTIC EXERCISES: CPT | Mod: GP | Performed by: PHYSICAL THERAPIST

## 2023-12-20 PROCEDURE — 97140 MANUAL THERAPY 1/> REGIONS: CPT | Mod: GP | Performed by: PHYSICAL THERAPIST

## 2024-02-06 PROBLEM — M75.41 IMPINGEMENT SYNDROME, SHOULDER, RIGHT: Status: RESOLVED | Noted: 2023-11-15 | Resolved: 2024-02-06

## 2024-02-06 NOTE — PROGRESS NOTES
DISCHARGE  Reason for Discharge: Patient has failed to schedule further appointments.    Equipment Issued: None    Discharge Plan: Patient to continue home program.    Referring Provider:  Anam Jiménez       12/20/23 0500   Appointment Info   Signing clinician's name / credentials Rajesh Rai DPT   Total/Authorized Visits EnT   Visits Used 5   Medical Diagnosis Rt shoulder impingement   PT Tx Diagnosis Rt shldr pain/imp   Other pertinent information NEXT: Check counter walk backs / Rows / B ER   Quick Adds Certification   Progress Note/Certification   Start of Care Date 11/15/23   Onset of illness/injury or Date of Surgery 11/09/23   Therapy Frequency weekly up to 12 visits   Predicted Duration 8 weeks   Certification date from 11/15/23   Certification date to 01/10/24   Progress Note Due Date 01/10/24   PT Goal 1   Goal Identifier Reach/Lift   Goal Description Reach to 2nd cupboard and lift a cup w/o pain   Rationale to maximize safety and independence with performance of ADLs and functional tasks;to maximize safety and independence within the home;to maximize safety and independence with self cares   Target Date 01/10/24   Subjective Report   Subjective Report Has not been able to do exercises last week due to traveling. 9/10 pain right now.   Objective Measures   Objective Measures Objective Measure 1;Objective Measure 2;Objective Measure 3   Objective Measure 1   Objective Measure Palpation   Details Hypomobility of glenohumeral joint and AC joint   Treatment Interventions (PT)   Interventions Manual Therapy;Therapeutic Procedure/Exercise   Therapeutic Procedure/Exercise   Therapeutic Procedures: strength, endurance, ROM, flexibillity minutes (49529) 26   Ther Proc 1 Low rows x15 with RTB x15   Skilled Intervention Strengthening and ROM exercises to improve function   Patient Response/Progress Discomfort with wall climbs at later reps, left in HEP and gave alternatives if shoulder is flared up   Ther Proc  2 Scapularis winging x10 with 5 second holds   Ther Proc 3 Wall climbs for flexion x10 / Wall climbs for flexion with eccentric lowering x3 (stopped due to discomfort)   Ther Proc 4 Table slides for flexion x9 with 5 second holds   Ther Proc 5 Table slides for scaption x10 with 5 second holds   Therapeutic Procedures Ther Proc 2;Ther Proc 3;Ther Proc 4;Ther Proc 5;Ther Proc 6   Ther Proc 6 Counter walk backs x10 with 5 second holds   Manual Therapy   Manual Therapy: Mobilization, MFR, MLD, friction massage minutes (37999) 13   Manual Therapy Manual Therapy 2;Manual Therapy 3;Manual Therapy 4;Manual Therapy 5;Manual Therapy 6   Manual Therapy 1 AP and inferior Glenohumeral joint mobilizations grades 1-3 to improve motion and reduce pain   Manual Therapy 2 AC joint mobilizations grades 1-3 to improve motion and reduce pain   Patient Response/Progress Improved motion   Total Session Time   Timed Code Treatment Minutes 39   Total Treatment Time (sum of timed and untimed services) 39

## 2024-06-13 ENCOUNTER — MEDICAL CORRESPONDENCE (OUTPATIENT)
Dept: HEALTH INFORMATION MANAGEMENT | Facility: CLINIC | Age: 84
End: 2024-06-13
Payer: COMMERCIAL

## 2024-06-19 RX ORDER — QUINIDINE GLUCONATE 324 MG
480 TABLET, EXTENDED RELEASE ORAL DAILY
COMMUNITY

## 2024-06-19 RX ORDER — HYDROCHLOROTHIAZIDE 12.5 MG/1
12.5 TABLET ORAL DAILY
Status: ON HOLD | COMMUNITY
Start: 2024-01-05 | End: 2024-06-25

## 2024-06-19 RX ORDER — SEMAGLUTIDE 1.34 MG/ML
0.5 INJECTION, SOLUTION SUBCUTANEOUS
COMMUNITY
Start: 2023-12-06

## 2024-06-19 RX ORDER — MELOXICAM 7.5 MG/1
TABLET ORAL
Status: ON HOLD | COMMUNITY
Start: 2023-10-01 | End: 2024-06-25

## 2024-06-19 RX ORDER — SACCHAROMYCES BOULARDII 250 MG
250 CAPSULE ORAL DAILY
COMMUNITY

## 2024-06-19 RX ORDER — CHLORAL HYDRATE 500 MG
1000 CAPSULE ORAL DAILY
Status: ON HOLD | COMMUNITY
End: 2024-06-24

## 2024-06-19 RX ORDER — FAMOTIDINE 20 MG/1
20 TABLET, FILM COATED ORAL DAILY
COMMUNITY

## 2024-06-19 RX ORDER — OXYCODONE HYDROCHLORIDE 5 MG/1
2.5-5 TABLET ORAL
Status: ON HOLD | COMMUNITY
Start: 2024-01-11 | End: 2024-06-24

## 2024-06-19 RX ORDER — MULTIPLE VITAMINS W/ MINERALS TAB 9MG-400MCG
1 TAB ORAL DAILY
COMMUNITY

## 2024-06-19 RX ORDER — VITAMIN E (DL,TOCOPHERYL ACET) 180 MG
1 CAPSULE ORAL DAILY
COMMUNITY

## 2024-06-24 ENCOUNTER — ANESTHESIA (OUTPATIENT)
Dept: SURGERY | Facility: CLINIC | Age: 84
End: 2024-06-24
Payer: COMMERCIAL

## 2024-06-24 ENCOUNTER — APPOINTMENT (OUTPATIENT)
Dept: RADIOLOGY | Facility: CLINIC | Age: 84
End: 2024-06-24
Attending: PHYSICIAN ASSISTANT
Payer: COMMERCIAL

## 2024-06-24 ENCOUNTER — HOSPITAL ENCOUNTER (OUTPATIENT)
Facility: CLINIC | Age: 84
Discharge: HOME | End: 2024-06-25
Attending: STUDENT IN AN ORGANIZED HEALTH CARE EDUCATION/TRAINING PROGRAM | Admitting: STUDENT IN AN ORGANIZED HEALTH CARE EDUCATION/TRAINING PROGRAM
Payer: COMMERCIAL

## 2024-06-24 ENCOUNTER — ANESTHESIA EVENT (OUTPATIENT)
Dept: SURGERY | Facility: CLINIC | Age: 84
End: 2024-06-24
Payer: COMMERCIAL

## 2024-06-24 DIAGNOSIS — Z96.611 STATUS POST REVERSE TOTAL REPLACEMENT OF RIGHT SHOULDER: Primary | ICD-10-CM

## 2024-06-24 DIAGNOSIS — I10 PRIMARY HYPERTENSION: ICD-10-CM

## 2024-06-24 PROBLEM — Z96.619 S/P REVERSE TOTAL SHOULDER ARTHROPLASTY: Status: ACTIVE | Noted: 2024-06-24

## 2024-06-24 LAB
GLUCOSE BLDC GLUCOMTR-MCNC: 147 MG/DL (ref 70–99)
GLUCOSE BLDC GLUCOMTR-MCNC: 156 MG/DL (ref 70–99)
GLUCOSE BLDC GLUCOMTR-MCNC: 171 MG/DL (ref 70–99)
GLUCOSE BLDC GLUCOMTR-MCNC: 216 MG/DL (ref 70–99)
GLUCOSE BLDC GLUCOMTR-MCNC: 243 MG/DL (ref 70–99)

## 2024-06-24 PROCEDURE — 250N000025 HC SEVOFLURANE, PER MIN: Performed by: STUDENT IN AN ORGANIZED HEALTH CARE EDUCATION/TRAINING PROGRAM

## 2024-06-24 PROCEDURE — C1776 JOINT DEVICE (IMPLANTABLE): HCPCS | Performed by: STUDENT IN AN ORGANIZED HEALTH CARE EDUCATION/TRAINING PROGRAM

## 2024-06-24 PROCEDURE — 258N000003 HC RX IP 258 OP 636: Mod: JZ | Performed by: PHYSICIAN ASSISTANT

## 2024-06-24 PROCEDURE — 250N000013 HC RX MED GY IP 250 OP 250 PS 637: Performed by: ANESTHESIOLOGY

## 2024-06-24 PROCEDURE — 250N000009 HC RX 250: Performed by: NURSE ANESTHETIST, CERTIFIED REGISTERED

## 2024-06-24 PROCEDURE — 250N000012 HC RX MED GY IP 250 OP 636 PS 637: Performed by: ANESTHESIOLOGY

## 2024-06-24 PROCEDURE — 250N000012 HC RX MED GY IP 250 OP 636 PS 637: Performed by: FAMILY MEDICINE

## 2024-06-24 PROCEDURE — 370N000017 HC ANESTHESIA TECHNICAL FEE, PER MIN: Performed by: STUDENT IN AN ORGANIZED HEALTH CARE EDUCATION/TRAINING PROGRAM

## 2024-06-24 PROCEDURE — 250N000011 HC RX IP 250 OP 636: Performed by: PHYSICIAN ASSISTANT

## 2024-06-24 PROCEDURE — 250N000011 HC RX IP 250 OP 636: Performed by: ANESTHESIOLOGY

## 2024-06-24 PROCEDURE — 99204 OFFICE O/P NEW MOD 45 MIN: CPT | Performed by: FAMILY MEDICINE

## 2024-06-24 PROCEDURE — 258N000003 HC RX IP 258 OP 636: Mod: JZ | Performed by: ANESTHESIOLOGY

## 2024-06-24 PROCEDURE — 999N000065 XR SHOULDER RIGHT PORT G/E 2 VIEWS: Mod: RT

## 2024-06-24 PROCEDURE — 999N000141 HC STATISTIC PRE-PROCEDURE NURSING ASSESSMENT: Performed by: STUDENT IN AN ORGANIZED HEALTH CARE EDUCATION/TRAINING PROGRAM

## 2024-06-24 PROCEDURE — 258N000001 HC RX 258: Performed by: STUDENT IN AN ORGANIZED HEALTH CARE EDUCATION/TRAINING PROGRAM

## 2024-06-24 PROCEDURE — 82962 GLUCOSE BLOOD TEST: CPT

## 2024-06-24 PROCEDURE — 258N000003 HC RX IP 258 OP 636: Mod: JZ | Performed by: NURSE ANESTHETIST, CERTIFIED REGISTERED

## 2024-06-24 PROCEDURE — C9290 INJ, BUPIVACAINE LIPOSOME: HCPCS | Performed by: ANESTHESIOLOGY

## 2024-06-24 PROCEDURE — 360N000077 HC SURGERY LEVEL 4, PER MIN: Performed by: STUDENT IN AN ORGANIZED HEALTH CARE EDUCATION/TRAINING PROGRAM

## 2024-06-24 PROCEDURE — 272N000001 HC OR GENERAL SUPPLY STERILE: Performed by: STUDENT IN AN ORGANIZED HEALTH CARE EDUCATION/TRAINING PROGRAM

## 2024-06-24 PROCEDURE — 710N000010 HC RECOVERY PHASE 1, LEVEL 2, PER MIN: Performed by: STUDENT IN AN ORGANIZED HEALTH CARE EDUCATION/TRAINING PROGRAM

## 2024-06-24 PROCEDURE — 250N000011 HC RX IP 250 OP 636: Mod: JZ | Performed by: NURSE ANESTHETIST, CERTIFIED REGISTERED

## 2024-06-24 PROCEDURE — C1713 ANCHOR/SCREW BN/BN,TIS/BN: HCPCS | Performed by: STUDENT IN AN ORGANIZED HEALTH CARE EDUCATION/TRAINING PROGRAM

## 2024-06-24 PROCEDURE — 271N000001 HC OR GENERAL SUPPLY NON-STERILE: Performed by: STUDENT IN AN ORGANIZED HEALTH CARE EDUCATION/TRAINING PROGRAM

## 2024-06-24 PROCEDURE — 250N000013 HC RX MED GY IP 250 OP 250 PS 637: Performed by: PHYSICIAN ASSISTANT

## 2024-06-24 PROCEDURE — 250N000011 HC RX IP 250 OP 636: Mod: JZ | Performed by: ANESTHESIOLOGY

## 2024-06-24 PROCEDURE — 250N000013 HC RX MED GY IP 250 OP 250 PS 637: Performed by: FAMILY MEDICINE

## 2024-06-24 DEVICE — IMPLANTABLE DEVICE
Type: IMPLANTABLE DEVICE | Site: SHOULDER | Status: FUNCTIONAL
Brand: TORNIER PERFORM™ HUMERAL SYSTEM

## 2024-06-24 DEVICE — SCREW CENTRAL 6.5X30MM DWJ130: Type: IMPLANTABLE DEVICE | Site: SHOULDER | Status: FUNCTIONAL

## 2024-06-24 DEVICE — SCREW PERIPHERAL 5.0X30MM DWJ330: Type: IMPLANTABLE DEVICE | Site: SHOULDER | Status: FUNCTIONAL

## 2024-06-24 DEVICE — IMPLANTABLE DEVICE
Type: IMPLANTABLE DEVICE | Site: SHOULDER | Status: FUNCTIONAL
Brand: TORNIER PERFORM® REVERSED AUGMENTED GLENOID

## 2024-06-24 RX ORDER — NALOXONE HYDROCHLORIDE 0.4 MG/ML
0.2 INJECTION, SOLUTION INTRAMUSCULAR; INTRAVENOUS; SUBCUTANEOUS
Status: DISCONTINUED | OUTPATIENT
Start: 2024-06-24 | End: 2024-06-25 | Stop reason: HOSPADM

## 2024-06-24 RX ORDER — ASPIRIN 81 MG/1
81 TABLET ORAL 2 TIMES DAILY
COMMUNITY
Start: 2024-06-24

## 2024-06-24 RX ORDER — LIDOCAINE 40 MG/G
CREAM TOPICAL
Status: DISCONTINUED | OUTPATIENT
Start: 2024-06-24 | End: 2024-06-25 | Stop reason: HOSPADM

## 2024-06-24 RX ORDER — ACETAMINOPHEN 325 MG/1
975 TABLET ORAL EVERY 8 HOURS
Status: DISCONTINUED | OUTPATIENT
Start: 2024-06-24 | End: 2024-06-25 | Stop reason: HOSPADM

## 2024-06-24 RX ORDER — HYDROMORPHONE HCL IN WATER/PF 6 MG/30 ML
0.2 PATIENT CONTROLLED ANALGESIA SYRINGE INTRAVENOUS
Status: DISCONTINUED | OUTPATIENT
Start: 2024-06-24 | End: 2024-06-25 | Stop reason: HOSPADM

## 2024-06-24 RX ORDER — EPHEDRINE SULFATE 50 MG/ML
INJECTION, SOLUTION INTRAMUSCULAR; INTRAVENOUS; SUBCUTANEOUS PRN
Status: DISCONTINUED | OUTPATIENT
Start: 2024-06-24 | End: 2024-06-24

## 2024-06-24 RX ORDER — NICOTINE POLACRILEX 4 MG
15-30 LOZENGE BUCCAL
Status: DISCONTINUED | OUTPATIENT
Start: 2024-06-24 | End: 2024-06-25 | Stop reason: HOSPADM

## 2024-06-24 RX ORDER — ONDANSETRON 2 MG/ML
4 INJECTION INTRAMUSCULAR; INTRAVENOUS EVERY 30 MIN PRN
Status: DISCONTINUED | OUTPATIENT
Start: 2024-06-24 | End: 2024-06-24 | Stop reason: HOSPADM

## 2024-06-24 RX ORDER — SODIUM CHLORIDE, SODIUM LACTATE, POTASSIUM CHLORIDE, CALCIUM CHLORIDE 600; 310; 30; 20 MG/100ML; MG/100ML; MG/100ML; MG/100ML
INJECTION, SOLUTION INTRAVENOUS CONTINUOUS
Status: DISCONTINUED | OUTPATIENT
Start: 2024-06-24 | End: 2024-06-24 | Stop reason: HOSPADM

## 2024-06-24 RX ORDER — ONDANSETRON 2 MG/ML
4 INJECTION INTRAMUSCULAR; INTRAVENOUS EVERY 6 HOURS PRN
Status: DISCONTINUED | OUTPATIENT
Start: 2024-06-24 | End: 2024-06-25 | Stop reason: HOSPADM

## 2024-06-24 RX ORDER — AMOXICILLIN 250 MG
1 CAPSULE ORAL 2 TIMES DAILY
Status: DISCONTINUED | OUTPATIENT
Start: 2024-06-24 | End: 2024-06-25 | Stop reason: HOSPADM

## 2024-06-24 RX ORDER — ONDANSETRON 4 MG/1
4 TABLET, ORALLY DISINTEGRATING ORAL EVERY 6 HOURS PRN
Status: DISCONTINUED | OUTPATIENT
Start: 2024-06-24 | End: 2024-06-25 | Stop reason: HOSPADM

## 2024-06-24 RX ORDER — GLYCOPYRROLATE 0.2 MG/ML
INJECTION, SOLUTION INTRAMUSCULAR; INTRAVENOUS PRN
Status: DISCONTINUED | OUTPATIENT
Start: 2024-06-24 | End: 2024-06-24

## 2024-06-24 RX ORDER — GABAPENTIN 300 MG/1
900 CAPSULE ORAL AT BEDTIME
Status: DISCONTINUED | OUTPATIENT
Start: 2024-06-24 | End: 2024-06-25 | Stop reason: HOSPADM

## 2024-06-24 RX ORDER — ASPIRIN 81 MG/1
81 TABLET ORAL 2 TIMES DAILY
Status: DISCONTINUED | OUTPATIENT
Start: 2024-06-24 | End: 2024-06-25 | Stop reason: HOSPADM

## 2024-06-24 RX ORDER — NALOXONE HYDROCHLORIDE 0.4 MG/ML
0.1 INJECTION, SOLUTION INTRAMUSCULAR; INTRAVENOUS; SUBCUTANEOUS
Status: DISCONTINUED | OUTPATIENT
Start: 2024-06-24 | End: 2024-06-24 | Stop reason: HOSPADM

## 2024-06-24 RX ORDER — BUPIVACAINE HYDROCHLORIDE 5 MG/ML
INJECTION, SOLUTION EPIDURAL; INTRACAUDAL
Status: COMPLETED | OUTPATIENT
Start: 2024-06-24 | End: 2024-06-24

## 2024-06-24 RX ORDER — EZETIMIBE 10 MG/1
10 TABLET ORAL AT BEDTIME
Status: DISCONTINUED | OUTPATIENT
Start: 2024-06-24 | End: 2024-06-25 | Stop reason: HOSPADM

## 2024-06-24 RX ORDER — ACETAMINOPHEN 325 MG/1
650 TABLET ORAL EVERY 4 HOURS PRN
Status: DISCONTINUED | OUTPATIENT
Start: 2024-06-27 | End: 2024-06-25 | Stop reason: HOSPADM

## 2024-06-24 RX ORDER — SODIUM CHLORIDE, SODIUM LACTATE, POTASSIUM CHLORIDE, CALCIUM CHLORIDE 600; 310; 30; 20 MG/100ML; MG/100ML; MG/100ML; MG/100ML
INJECTION, SOLUTION INTRAVENOUS CONTINUOUS
Status: DISCONTINUED | OUTPATIENT
Start: 2024-06-24 | End: 2024-06-25 | Stop reason: HOSPADM

## 2024-06-24 RX ORDER — ACETAMINOPHEN 325 MG/1
650 TABLET ORAL EVERY 4 HOURS PRN
Qty: 100 TABLET | Refills: 0 | Status: SHIPPED | OUTPATIENT
Start: 2024-06-24

## 2024-06-24 RX ORDER — BIOTIN 10000 MCG
1 CAPSULE ORAL DAILY
Status: DISCONTINUED | OUTPATIENT
Start: 2024-06-24 | End: 2024-06-24

## 2024-06-24 RX ORDER — LIDOCAINE HYDROCHLORIDE 20 MG/ML
INJECTION, SOLUTION INFILTRATION; PERINEURAL PRN
Status: DISCONTINUED | OUTPATIENT
Start: 2024-06-24 | End: 2024-06-24

## 2024-06-24 RX ORDER — HYDROMORPHONE HCL IN WATER/PF 6 MG/30 ML
0.4 PATIENT CONTROLLED ANALGESIA SYRINGE INTRAVENOUS EVERY 5 MIN PRN
Status: DISCONTINUED | OUTPATIENT
Start: 2024-06-24 | End: 2024-06-24 | Stop reason: HOSPADM

## 2024-06-24 RX ORDER — HYDROMORPHONE HCL IN WATER/PF 6 MG/30 ML
0.2 PATIENT CONTROLLED ANALGESIA SYRINGE INTRAVENOUS EVERY 5 MIN PRN
Status: DISCONTINUED | OUTPATIENT
Start: 2024-06-24 | End: 2024-06-24 | Stop reason: HOSPADM

## 2024-06-24 RX ORDER — CEFAZOLIN SODIUM/WATER 2 G/20 ML
2 SYRINGE (ML) INTRAVENOUS SEE ADMIN INSTRUCTIONS
Status: DISCONTINUED | OUTPATIENT
Start: 2024-06-24 | End: 2024-06-24 | Stop reason: HOSPADM

## 2024-06-24 RX ORDER — METOPROLOL SUCCINATE 25 MG/1
25 TABLET, EXTENDED RELEASE ORAL DAILY
Status: DISCONTINUED | OUTPATIENT
Start: 2024-06-24 | End: 2024-06-25 | Stop reason: HOSPADM

## 2024-06-24 RX ORDER — PROCHLORPERAZINE MALEATE 5 MG
5 TABLET ORAL EVERY 6 HOURS PRN
Status: DISCONTINUED | OUTPATIENT
Start: 2024-06-24 | End: 2024-06-25 | Stop reason: HOSPADM

## 2024-06-24 RX ORDER — DEXAMETHASONE SODIUM PHOSPHATE 4 MG/ML
4 INJECTION, SOLUTION INTRA-ARTICULAR; INTRALESIONAL; INTRAMUSCULAR; INTRAVENOUS; SOFT TISSUE
Status: DISCONTINUED | OUTPATIENT
Start: 2024-06-24 | End: 2024-06-24 | Stop reason: HOSPADM

## 2024-06-24 RX ORDER — ACETAMINOPHEN 325 MG/1
975 TABLET ORAL ONCE
Status: COMPLETED | OUTPATIENT
Start: 2024-06-24 | End: 2024-06-24

## 2024-06-24 RX ORDER — VANCOMYCIN HYDROCHLORIDE 1 G/20ML
1 INJECTION, POWDER, LYOPHILIZED, FOR SOLUTION INTRAVENOUS ONCE
Status: COMPLETED | OUTPATIENT
Start: 2024-06-24 | End: 2024-06-24

## 2024-06-24 RX ORDER — FENTANYL CITRATE 50 UG/ML
50 INJECTION, SOLUTION INTRAMUSCULAR; INTRAVENOUS EVERY 5 MIN PRN
Status: DISCONTINUED | OUTPATIENT
Start: 2024-06-24 | End: 2024-06-24 | Stop reason: HOSPADM

## 2024-06-24 RX ORDER — PROPOFOL 10 MG/ML
INJECTION, EMULSION INTRAVENOUS PRN
Status: DISCONTINUED | OUTPATIENT
Start: 2024-06-24 | End: 2024-06-24

## 2024-06-24 RX ORDER — OXYCODONE HYDROCHLORIDE 5 MG/1
5 TABLET ORAL EVERY 4 HOURS PRN
Status: DISCONTINUED | OUTPATIENT
Start: 2024-06-24 | End: 2024-06-25 | Stop reason: HOSPADM

## 2024-06-24 RX ORDER — ALLOPURINOL 300 MG/1
300 TABLET ORAL DAILY
Status: DISCONTINUED | OUTPATIENT
Start: 2024-06-24 | End: 2024-06-25 | Stop reason: HOSPADM

## 2024-06-24 RX ORDER — OXYCODONE HYDROCHLORIDE 5 MG/1
5-10 TABLET ORAL EVERY 4 HOURS PRN
Qty: 30 TABLET | Refills: 0 | Status: SHIPPED | OUTPATIENT
Start: 2024-06-24

## 2024-06-24 RX ORDER — DIPHENOXYLATE HCL/ATROPINE 2.5-.025MG
1 TABLET ORAL 2 TIMES DAILY PRN
Status: ON HOLD | COMMUNITY
End: 2024-06-25

## 2024-06-24 RX ORDER — BIOTIN 10000 MCG
1 CAPSULE ORAL DAILY
COMMUNITY

## 2024-06-24 RX ORDER — BISACODYL 10 MG
10 SUPPOSITORY, RECTAL RECTAL DAILY PRN
Status: DISCONTINUED | OUTPATIENT
Start: 2024-06-27 | End: 2024-06-25 | Stop reason: HOSPADM

## 2024-06-24 RX ORDER — VANCOMYCIN HYDROCHLORIDE 1 G/20ML
INJECTION, POWDER, LYOPHILIZED, FOR SOLUTION INTRAVENOUS
Status: DISCONTINUED
Start: 2024-06-24 | End: 2024-06-24 | Stop reason: HOSPADM

## 2024-06-24 RX ORDER — TRANEXAMIC ACID 650 MG/1
1950 TABLET ORAL ONCE
Status: COMPLETED | OUTPATIENT
Start: 2024-06-24 | End: 2024-06-24

## 2024-06-24 RX ORDER — FENTANYL CITRATE 50 UG/ML
25 INJECTION, SOLUTION INTRAMUSCULAR; INTRAVENOUS EVERY 5 MIN PRN
Status: DISCONTINUED | OUTPATIENT
Start: 2024-06-24 | End: 2024-06-24 | Stop reason: HOSPADM

## 2024-06-24 RX ORDER — CETIRIZINE HYDROCHLORIDE 10 MG/1
20 TABLET ORAL DAILY
Status: DISCONTINUED | OUTPATIENT
Start: 2024-06-24 | End: 2024-06-25 | Stop reason: HOSPADM

## 2024-06-24 RX ORDER — FENTANYL CITRATE 50 UG/ML
25-100 INJECTION, SOLUTION INTRAMUSCULAR; INTRAVENOUS
Status: DISCONTINUED | OUTPATIENT
Start: 2024-06-24 | End: 2024-06-24 | Stop reason: HOSPADM

## 2024-06-24 RX ORDER — DEXTROSE MONOHYDRATE 25 G/50ML
25-50 INJECTION, SOLUTION INTRAVENOUS
Status: DISCONTINUED | OUTPATIENT
Start: 2024-06-24 | End: 2024-06-25 | Stop reason: HOSPADM

## 2024-06-24 RX ORDER — LEVOTHYROXINE SODIUM 50 UG/1
100 TABLET ORAL DAILY
Status: DISCONTINUED | OUTPATIENT
Start: 2024-06-25 | End: 2024-06-25 | Stop reason: HOSPADM

## 2024-06-24 RX ORDER — NALOXONE HYDROCHLORIDE 0.4 MG/ML
0.4 INJECTION, SOLUTION INTRAMUSCULAR; INTRAVENOUS; SUBCUTANEOUS
Status: DISCONTINUED | OUTPATIENT
Start: 2024-06-24 | End: 2024-06-25 | Stop reason: HOSPADM

## 2024-06-24 RX ORDER — ONDANSETRON 4 MG/1
4 TABLET, ORALLY DISINTEGRATING ORAL EVERY 30 MIN PRN
Status: DISCONTINUED | OUTPATIENT
Start: 2024-06-24 | End: 2024-06-24 | Stop reason: HOSPADM

## 2024-06-24 RX ORDER — DEXAMETHASONE SODIUM PHOSPHATE 4 MG/ML
INJECTION, SOLUTION INTRA-ARTICULAR; INTRALESIONAL; INTRAMUSCULAR; INTRAVENOUS; SOFT TISSUE PRN
Status: DISCONTINUED | OUTPATIENT
Start: 2024-06-24 | End: 2024-06-24

## 2024-06-24 RX ORDER — CEFAZOLIN SODIUM/WATER 2 G/20 ML
2 SYRINGE (ML) INTRAVENOUS
Status: COMPLETED | OUTPATIENT
Start: 2024-06-24 | End: 2024-06-24

## 2024-06-24 RX ORDER — ONDANSETRON 2 MG/ML
INJECTION INTRAMUSCULAR; INTRAVENOUS PRN
Status: DISCONTINUED | OUTPATIENT
Start: 2024-06-24 | End: 2024-06-24

## 2024-06-24 RX ORDER — LIDOCAINE 40 MG/G
CREAM TOPICAL
Status: DISCONTINUED | OUTPATIENT
Start: 2024-06-24 | End: 2024-06-24 | Stop reason: HOSPADM

## 2024-06-24 RX ORDER — FAMOTIDINE 20 MG/1
20 TABLET, FILM COATED ORAL DAILY
Status: DISCONTINUED | OUTPATIENT
Start: 2024-06-24 | End: 2024-06-25 | Stop reason: HOSPADM

## 2024-06-24 RX ORDER — MAGNESIUM OXIDE 400 MG/1
400 TABLET ORAL DAILY
Status: DISCONTINUED | OUTPATIENT
Start: 2024-06-24 | End: 2024-06-25 | Stop reason: HOSPADM

## 2024-06-24 RX ORDER — HYDROMORPHONE HCL IN WATER/PF 6 MG/30 ML
0.4 PATIENT CONTROLLED ANALGESIA SYRINGE INTRAVENOUS
Status: DISCONTINUED | OUTPATIENT
Start: 2024-06-24 | End: 2024-06-25 | Stop reason: HOSPADM

## 2024-06-24 RX ORDER — APREPITANT 40 MG/1
40 CAPSULE ORAL ONCE
Status: COMPLETED | OUTPATIENT
Start: 2024-06-24 | End: 2024-06-24

## 2024-06-24 RX ORDER — CEFAZOLIN SODIUM 2 G/100ML
2 INJECTION, SOLUTION INTRAVENOUS EVERY 8 HOURS
Status: COMPLETED | OUTPATIENT
Start: 2024-06-24 | End: 2024-06-24

## 2024-06-24 RX ORDER — POLYETHYLENE GLYCOL 3350 17 G/17G
17 POWDER, FOR SOLUTION ORAL DAILY
Status: DISCONTINUED | OUTPATIENT
Start: 2024-06-25 | End: 2024-06-25 | Stop reason: HOSPADM

## 2024-06-24 RX ORDER — LISINOPRIL 10 MG/1
10 TABLET ORAL DAILY
Status: ON HOLD | COMMUNITY
End: 2024-06-25

## 2024-06-24 RX ORDER — OXYCODONE HYDROCHLORIDE 5 MG/1
10 TABLET ORAL EVERY 4 HOURS PRN
Status: DISCONTINUED | OUTPATIENT
Start: 2024-06-24 | End: 2024-06-25 | Stop reason: HOSPADM

## 2024-06-24 RX ORDER — PROPOFOL 10 MG/ML
INJECTION, EMULSION INTRAVENOUS CONTINUOUS PRN
Status: DISCONTINUED | OUTPATIENT
Start: 2024-06-24 | End: 2024-06-24

## 2024-06-24 RX ORDER — LISINOPRIL 10 MG/1
10 TABLET ORAL DAILY
Status: DISCONTINUED | OUTPATIENT
Start: 2024-06-24 | End: 2024-06-24

## 2024-06-24 RX ADMIN — SENNOSIDES AND DOCUSATE SODIUM 1 TABLET: 50; 8.6 TABLET ORAL at 14:17

## 2024-06-24 RX ADMIN — PHENYLEPHRINE HYDROCHLORIDE 50 MCG: 10 INJECTION INTRAVENOUS at 10:40

## 2024-06-24 RX ADMIN — PROPOFOL 200 MCG/KG/MIN: 10 INJECTION, EMULSION INTRAVENOUS at 09:22

## 2024-06-24 RX ADMIN — LIDOCAINE HYDROCHLORIDE 30 MG: 20 INJECTION, SOLUTION INFILTRATION; PERINEURAL at 09:22

## 2024-06-24 RX ADMIN — SODIUM CHLORIDE, POTASSIUM CHLORIDE, SODIUM LACTATE AND CALCIUM CHLORIDE: 600; 310; 30; 20 INJECTION, SOLUTION INTRAVENOUS at 21:32

## 2024-06-24 RX ADMIN — TRANEXAMIC ACID 1950 MG: 650 TABLET ORAL at 07:25

## 2024-06-24 RX ADMIN — FAMOTIDINE 20 MG: 20 TABLET, FILM COATED ORAL at 14:17

## 2024-06-24 RX ADMIN — SUGAMMADEX 100 MG: 100 INJECTION, SOLUTION INTRAVENOUS at 10:15

## 2024-06-24 RX ADMIN — CEFAZOLIN SODIUM 2 G: 2 INJECTION, SOLUTION INTRAVENOUS at 21:52

## 2024-06-24 RX ADMIN — BUPIVACAINE 10 ML: 13.3 INJECTION, SUSPENSION, LIPOSOMAL INFILTRATION at 08:53

## 2024-06-24 RX ADMIN — ALLOPURINOL 300 MG: 300 TABLET ORAL at 14:17

## 2024-06-24 RX ADMIN — PHENYLEPHRINE HYDROCHLORIDE 50 MCG: 10 INJECTION INTRAVENOUS at 11:00

## 2024-06-24 RX ADMIN — ACETAMINOPHEN 975 MG: 325 TABLET ORAL at 17:41

## 2024-06-24 RX ADMIN — Medication 5 MG: at 10:55

## 2024-06-24 RX ADMIN — FENTANYL CITRATE 50 MCG: 50 INJECTION INTRAMUSCULAR; INTRAVENOUS at 09:22

## 2024-06-24 RX ADMIN — EZETIMIBE 10 MG: 10 TABLET ORAL at 21:36

## 2024-06-24 RX ADMIN — SENNOSIDES AND DOCUSATE SODIUM 1 TABLET: 50; 8.6 TABLET ORAL at 21:33

## 2024-06-24 RX ADMIN — Medication 5 MG: at 10:10

## 2024-06-24 RX ADMIN — SODIUM CHLORIDE, POTASSIUM CHLORIDE, SODIUM LACTATE AND CALCIUM CHLORIDE: 600; 310; 30; 20 INJECTION, SOLUTION INTRAVENOUS at 14:21

## 2024-06-24 RX ADMIN — METOPROLOL SUCCINATE 25 MG: 25 TABLET, EXTENDED RELEASE ORAL at 21:33

## 2024-06-24 RX ADMIN — INSULIN ASPART 1 UNITS: 100 INJECTION, SOLUTION INTRAVENOUS; SUBCUTANEOUS at 14:46

## 2024-06-24 RX ADMIN — FENTANYL CITRATE 50 MCG: 50 INJECTION INTRAMUSCULAR; INTRAVENOUS at 08:31

## 2024-06-24 RX ADMIN — INSULIN ASPART 3 UNITS: 100 INJECTION, SOLUTION INTRAVENOUS; SUBCUTANEOUS at 17:42

## 2024-06-24 RX ADMIN — BUPIVACAINE HYDROCHLORIDE 4 ML: 5 INJECTION, SOLUTION EPIDURAL; INTRACAUDAL; PERINEURAL at 08:51

## 2024-06-24 RX ADMIN — ASPIRIN 81 MG: 81 TABLET, COATED ORAL at 14:17

## 2024-06-24 RX ADMIN — Medication 5 MG: at 09:35

## 2024-06-24 RX ADMIN — SODIUM CHLORIDE, POTASSIUM CHLORIDE, SODIUM LACTATE AND CALCIUM CHLORIDE: 600; 310; 30; 20 INJECTION, SOLUTION INTRAVENOUS at 08:06

## 2024-06-24 RX ADMIN — ROCURONIUM BROMIDE 50 MG: 10 INJECTION, SOLUTION INTRAVENOUS at 09:23

## 2024-06-24 RX ADMIN — CETIRIZINE HYDROCHLORIDE 20 MG: 10 TABLET, FILM COATED ORAL at 14:17

## 2024-06-24 RX ADMIN — PROPOFOL 100 MG: 10 INJECTION, EMULSION INTRAVENOUS at 09:22

## 2024-06-24 RX ADMIN — Medication 400 MG: at 14:17

## 2024-06-24 RX ADMIN — GLYCOPYRROLATE 0.2 MG: 0.2 INJECTION INTRAMUSCULAR; INTRAVENOUS at 09:55

## 2024-06-24 RX ADMIN — DEXMEDETOMIDINE HYDROCHLORIDE 8 MCG: 100 INJECTION, SOLUTION INTRAVENOUS at 10:00

## 2024-06-24 RX ADMIN — APREPITANT 40 MG: 40 CAPSULE ORAL at 08:24

## 2024-06-24 RX ADMIN — DEXAMETHASONE SODIUM PHOSPHATE 4 MG: 4 INJECTION, SOLUTION INTRA-ARTICULAR; INTRALESIONAL; INTRAMUSCULAR; INTRAVENOUS; SOFT TISSUE at 09:37

## 2024-06-24 RX ADMIN — ACETAMINOPHEN 975 MG: 325 TABLET ORAL at 08:05

## 2024-06-24 RX ADMIN — ONDANSETRON 4 MG: 2 INJECTION INTRAMUSCULAR; INTRAVENOUS at 10:59

## 2024-06-24 RX ADMIN — ASPIRIN 81 MG: 81 TABLET, COATED ORAL at 21:33

## 2024-06-24 RX ADMIN — Medication 2 G: at 09:13

## 2024-06-24 RX ADMIN — GABAPENTIN 900 MG: 300 CAPSULE ORAL at 21:32

## 2024-06-24 RX ADMIN — Medication 5 MG: at 09:52

## 2024-06-24 RX ADMIN — CEFAZOLIN SODIUM 2 G: 2 INJECTION, SOLUTION INTRAVENOUS at 14:17

## 2024-06-24 RX ADMIN — BUPIVACAINE HYDROCHLORIDE 8 ML: 5 INJECTION, SOLUTION EPIDURAL; INTRACAUDAL at 08:53

## 2024-06-24 RX ADMIN — Medication 5 MG: at 10:35

## 2024-06-24 RX ADMIN — MIDAZOLAM HYDROCHLORIDE 1 MG: 1 INJECTION, SOLUTION INTRAMUSCULAR; INTRAVENOUS at 08:31

## 2024-06-24 RX ADMIN — DEXMEDETOMIDINE HYDROCHLORIDE 8 MCG: 100 INJECTION, SOLUTION INTRAVENOUS at 09:29

## 2024-06-24 ASSESSMENT — ACTIVITIES OF DAILY LIVING (ADL)
ADLS_ACUITY_SCORE: 27
ADLS_ACUITY_SCORE: 25
ADLS_ACUITY_SCORE: 27
ADLS_ACUITY_SCORE: 27
ADLS_ACUITY_SCORE: 25
ADLS_ACUITY_SCORE: 27
ADLS_ACUITY_SCORE: 25

## 2024-06-24 NOTE — ANESTHESIA PREPROCEDURE EVALUATION
Anesthesia Pre-Procedure Evaluation    Patient: Sabrina Burrell   MRN: [id number] : 1940        Procedure : Procedure(s):  RIGHT REVERSE TOTAL SHOULDER ARTHROPLASTY          Past Medical History:   Diagnosis Date    Diabetes (H)     Gastroesophageal reflux disease     Hyperlipidemia     Hypertension     PONV (postoperative nausea and vomiting)     Thyroid disease     HYPOTHYROID      Past Surgical History:   Procedure Laterality Date    APPENDECTOMY      DAVINCI SACROCOLPOPEXY, MIDURETHRAL SLING, CYSTOSCOPY Left 2019    Procedure: ROBOTIC ASSISTED XI SACROCOLPOPEXY WITH MESH IMPLANT, CYSTOSCOPY, AND LYSIS OF ADHESIONS.;  Surgeon: Barrie Oro MD;  Location:  OR    GENITOURINARY SURGERY      INTERSTIM    GYN SURGERY      D AND C    IMPLANT STIMULATOR AND LEADS SACRAL NERVE (STAGE ONE AND TWO) N/A 2017    Procedure: IMPLANT STIMULATOR AND LEADS SACRAL NERVE (STAGE ONE AND TWO);  INTERSTIM STAGE ONE AND TWO PLACEMENT;  Surgeon: Barrie Oro MD;  Location:  OR    ORTHOPEDIC SURGERY      LEFT KNEE REPLACEMENT    PHACOEMULSIFICATION WITH STANDARD INTRAOCULAR LENS IMPLANT  10/7/2013    Procedure: PHACOEMULSIFICATION WITH STANDARD INTRAOCULAR LENS IMPLANT;  Right Kelman Phacoemulsification with Intraocular Lens Implant;  Surgeon: Tian Yates MD;  Location: [facility] OR      Allergies   Allergen Reactions    Pravastatin Nausea    Simvastatin Muscle Pain (Myalgia)      Social History     Tobacco Use    Smoking status: Never    Smokeless tobacco: Never   Substance Use Topics    Alcohol use: Yes     Comment: 1 PER WEEK      Wt Readings from Last 1 Encounters:   24 57.6 kg (127 lb)        Anesthesia Evaluation   Pt has had prior anesthetic.     History of anesthetic complications  - PONV.      ROS/MED HX  ENT/Pulmonary:  - neg pulmonary ROS     Neurologic:  - neg neurologic ROS     Cardiovascular:     (+)  hypertension- -   -  - -                                     "  METS/Exercise Tolerance:     Hematologic:  - neg hematologic  ROS     Musculoskeletal:  - neg musculoskeletal ROS     GI/Hepatic:     (+) GERD,                   Renal/Genitourinary:  - neg Renal ROS     Endo:     (+)  type II DM,        thyroid problem,            Psychiatric/Substance Use:       Infectious Disease:       Malignancy:       Other:            Physical Exam    Airway  airway exam normal      Mallampati: II   TM distance: > 3 FB   Neck ROM: full   Mouth opening: > 3 cm    Respiratory Devices and Support         Dental       (+) Minor Abnormalities - some fillings, tiny chips      Cardiovascular   cardiovascular exam normal          Pulmonary   pulmonary exam normal                OUTSIDE LABS:  CBC: No results found for: \"WBC\", \"HGB\", \"HCT\", \"PLT\"  BMP:   Lab Results   Component Value Date     07/23/2019    POTASSIUM 4.7 07/23/2019    CHLORIDE 106 07/23/2019    CO2 27 07/23/2019    BUN 26 07/23/2019    CR 1.18 (H) 07/23/2019    CR 1.09 (H) 07/22/2019     (H) 07/23/2019     COAGS: No results found for: \"PTT\", \"INR\", \"FIBR\"  POC:   Lab Results   Component Value Date     (H) 07/23/2019     HEPATIC: No results found for: \"ALBUMIN\", \"PROTTOTAL\", \"ALT\", \"AST\", \"GGT\", \"ALKPHOS\", \"BILITOTAL\", \"BILIDIRECT\", \"KIARA\"  OTHER:   Lab Results   Component Value Date    KIMBERLY 8.3 (L) 07/23/2019       Anesthesia Plan    ASA Status:  2    NPO Status:  NPO Appropriate    Anesthesia Type: General.     - Airway: LMA   Induction: Intravenous.   Maintenance: TIVA.        Consents    Anesthesia Plan(s) and associated risks, benefits, and realistic alternatives discussed. Questions answered and patient/representative(s) expressed understanding.     - Discussed:     - Discussed with:  Patient      - Extended Intubation/Ventilatory Support Discussed: No.      - Patient is DNR/DNI Status: No     Use of blood products discussed: No .     Postoperative Care    Pain management: Multi-modal analgesia.   PONV " prophylaxis: Ondansetron (or other 5HT-3), Dexamethasone or Solumedrol     Comments:               Estephania Becker MD    I have reviewed the pertinent notes and labs in the chart from the past 30 days and (re)examined the patient.  Any updates or changes from those notes are reflected in this note.

## 2024-06-24 NOTE — BRIEF OP NOTE
[facility]    Brief Operative Note    Pre-operative diagnosis: Osteoarthritis of right shoulder [M19.011]  Rotator cuff tear [M75.100]  Post-operative diagnosis Same as pre-operative diagnosis    Procedure: RIGHT REVERSE TOTAL SHOULDER ARTHROPLASTY, Right - Shoulder    Surgeon: Surgeons and Role:     * Shawna Del Valle MD - Primary     * Lyndsey Washburn PA-C - Assisting  Anesthesia: General with Block   Estimated Blood Loss: Less than 100 ml    Drains: None  Specimens: * No specimens in log *  Findings:   Massive rotator cuff tear  Complications: None.  Implants:   Implant Name Type Inv. Item Serial No.  Lot No. LRB No. Used Action   BASEPLATE LATERAL RVRS 25MM OFFS +3MM [id number] - [id number] Total Joint Component/Insert BASEPLATE LATERAL RVRS 25MM OFFS +3MM [id number] [employer] NA Right 1 Implanted   Cannulated CoCr Standard [place] CoCr + [id number] Total Joint Component/Insert  [id number] [employer]  Right 1 Implanted   SCREW CENTRAL 6.5X30MM [id number] - [id number] Metallic Hardware/[place] SCREW CENTRAL 6.5X30MM [id number]  [employer] NA Right 1 Implanted   SCREW PERIPHERAL 5.0X30MM [id number] - [id number] Metallic Hardware/[place] SCREW PERIPHERAL 5.0X30MM [id number]  [employer] NA Right 1 Implanted   SCREW PERIPHERAL 5.0X30MM [id number] - [id number] Metallic Hardware/[place] SCREW PERIPHERAL 5.0X30MM [id number]  [employer] NA Right 1 Implanted   Humeral Stem, PLUS, SHORT [id number] + Ti 2+ Total Joint Component/Insert  [id number]   Right 1 Implanted   Reversed Insert, Thickness, +3mm UHWPE + [id number] Total Joint Component/Insert  [id number] [employer]  Right 1 Implanted     NATACHAB ALISTAIR sling  24 hrs post op ancef  PT/OT  DVT: ASA 81 daily x 2 weeks, SCDs, lower extremity weight bearing    SHAWNA DEL VALLE MD

## 2024-06-24 NOTE — ANESTHESIA CARE TRANSFER NOTE
Patient: Sabrina Burrell    Procedure: Procedure(s):  RIGHT REVERSE TOTAL SHOULDER ARTHROPLASTY       Diagnosis: Osteoarthritis of right shoulder [M19.011]  Rotator cuff tear [M75.100]  Diagnosis Additional Information: No value filed.    Anesthesia Type:   General     Note:    Oropharynx: oropharynx clear of all foreign objects and spontaneously breathing  Level of Consciousness: drowsy  Oxygen Supplementation: face mask  Level of Supplemental Oxygen (L/min / FiO2): 8  Independent Airway: airway patency satisfactory and stable  Dentition: dentition unchanged  Vital Signs Stable: post-procedure vital signs reviewed and stable  Report to RN Given: handoff report given  Patient transferred to: PACU    Handoff Report: Identifed the Patient, Identified the Reponsible Provider, Reviewed the pertinent medical history, Discussed the surgical course, Reviewed Intra-OP anesthesia mangement and issues during anesthesia, Set expectations for post-procedure period and Allowed opportunity for questions and acknowledgement of understanding      Vitals:  Vitals Value Taken Time   /75 06/24/24 1113   Temp 97.8    Pulse 83 06/24/24 1114   Resp 18 06/24/24 1114   SpO2 99 % 06/24/24 1114   Vitals shown include unfiled device data.    Electronically Signed By: RANULFO IRVIN CRNA  June 24, 2024  11:15 AM

## 2024-06-24 NOTE — ANESTHESIA POSTPROCEDURE EVALUATION
Patient: Sabrina Burrell    Procedure: Procedure(s):  RIGHT REVERSE TOTAL SHOULDER ARTHROPLASTY       Anesthesia Type:  General    Note:  Disposition: Inpatient   Postop Pain Control: Uneventful            Sign Out: Well controlled pain   PONV: No   Neuro/Psych: Uneventful            Sign Out: Acceptable/Baseline neuro status   Airway/Respiratory: Uneventful            Sign Out: Acceptable/Baseline resp. status   CV/Hemodynamics: Uneventful            Sign Out: Acceptable CV status; No obvious hypovolemia; No obvious fluid overload   Other NRE: NONE   DID A NON-ROUTINE EVENT OCCUR?            Last vitals:  Vitals Value Taken Time   /67 06/24/24 1210   Temp 36.6  C (97.8  F) 06/24/24 1113   Pulse 80 06/24/24 1210   Resp 18 06/24/24 1210   SpO2 98 % 06/24/24 1210       Electronically Signed By: Estephania Becker MD  June 24, 2024  12:30 PM

## 2024-06-24 NOTE — CONSULTS
[facility] MEDICINE CONSULT NOTE   Physician requesting consult: Anam Jiménez MD    Reason for consult: Postoperative medical management of medical co-morbidities as below    Assessment and Plan    Sabrina Burrell is a 83 year old  female with a history of essential hypertension, dyslipidemia, DM2, hypothyroidism, CKD 3, GERD, underwent right total shoulder arthroplasty.   mL.  Right shoulder pain is stable.  Hemodynamically stable postoperatively.      Procedure(s):  RIGHT REVERSE TOTAL SHOULDER ARTHROPLASTY  Post-operative Day: Day of Surgery  Code status:Full Code     Estimated Blood Loss:  100 mL    Essential hypertension  Was on lisinopril and hydrochlorothiazide in the past  Blood pressure is on higher side  Metoprolol XL 25 mg daily    GERD  Resume H2 blocker    Hypothyroidism  Levothyroxine 100 mcg daily    DM2  Ozempic 0.5 mg once a week  Diabetic diet and insulin sliding scale  Hemoglobin A1c 7    CKD 3, creatinine 1.15    Dyslipidemia  Zetia 10 mg daily    Status post right shoulder arthroplasty  Resume routine postoperative care  Physical and Occupational Therapy  Nonweightbearing in right upper extremity  Use incentive spirometry frequently  DVT prophylaxis per orthopedics, aspirin 81 mg twice a day  Pain control with Tylenol 975 mg every 8 hours, 650 mg every 4 hours as needed, oxycodone 5 to 10 mg every 4 hours as needed, IV Dilaudid 0.2 to 0.4 mg every 2 hours as needed    -Reviewed the patient's preoperative H and P and updated missing elements.  -Home medication reconciliation has been reviewed.  Medications have been ordered as noted from the home list and changes are documented above     HISTORY     Sabrina Burrell is a 83 year old female with history of essential hypertension, dyslipidemia, DM2, hypothyroidism, GERD, underwent right total shoulder arthroplasty.   mL.  Right shoulder pain is stable.  Hemodynamically stable postoperatively.  She was on  lisinopril 10 mg daily, hydrochlorothiazide 12.5 mg daily for hypertension.  Lisinopril was discontinued due to hyperkalemia.  Potassium was 4.7 during preop physical.  Blood pressure is on higher side.  Patient is asymptomatic.  Taking Zetia 10 mg daily for dyslipidemia.  Pepcid 20 mg daily for GERD.  On Ozempic 0.5 mg once a week for DM2.  Denies headache, chest pain, breathing difficulty, palpitation, nausea, vomiting, abdominal pain or urinary symptoms.  Does not have history of heart disease, lung disease, bleeding or clotting disorder or sleep apnea.  Preop physical is reviewed.  History is provided by the patient.  Medical records are reviewed.    Past Medical History     Patient Active Problem List    Diagnosis Date Noted    S/p reverse total shoulder arthroplasty 06/24/2024     Priority: Medium    Chronic bilateral low back pain without sciatica 02/09/2022     Priority: Medium    Cystocele with prolapse 07/22/2019     Priority: Medium    Muscle weakness (generalized) 10/24/2014     Priority: Medium    Senile nuclear sclerosis 10/06/2013     Priority: Medium    Disorder of bursae and tendons in shoulder region 04/07/2011     Priority: Medium     Problem list name updated by automated process. Provider to review          Surgical History   She  has a past surgical history that includes Phacoemulsification with standard intraocular lens implant (10/7/2013); appendectomy; GYN surgery; orthopedic surgery (2007); Implant stimulator and leads sacral nerve (stage one and two) (N/A, 6/30/2017); Abdomen surgery; and DAVINCI SACROCOLPOPEXY, MIDURETHRAL SLING, CYSTOSCOPY (Left, 7/22/2019).     Past Surgical History:   Procedure Laterality Date    APPENDECTOMY      DAVINCI SACROCOLPOPEXY, MIDURETHRAL SLING, CYSTOSCOPY Left 7/22/2019    Procedure: ROBOTIC ASSISTED XI SACROCOLPOPEXY WITH MESH IMPLANT, CYSTOSCOPY, AND LYSIS OF ADHESIONS.;  Surgeon: Barrie Oro MD;  Location:  OR    GENITOURINARY SURGERY       INTERSTIM    GYN SURGERY      D AND C    IMPLANT STIMULATOR AND LEADS SACRAL NERVE (STAGE ONE AND TWO) N/A 6/30/2017    Procedure: IMPLANT STIMULATOR AND LEADS SACRAL NERVE (STAGE ONE AND TWO);  INTERSTIM STAGE ONE AND TWO PLACEMENT;  Surgeon: Barrie Oro MD;  Location:  OR    ORTHOPEDIC SURGERY  2007    LEFT KNEE REPLACEMENT    PHACOEMULSIFICATION WITH STANDARD INTRAOCULAR LENS IMPLANT  10/7/2013    Procedure: PHACOEMULSIFICATION WITH STANDARD INTRAOCULAR LENS IMPLANT;  Right Kelman Phacoemulsification with Intraocular Lens Implant;  Surgeon: Tian Yates MD;  Location: [facility] OR       Family History    Reviewed.  Mother had colon cancer    Social History    Reviewed, and she  reports that she has never smoked. She has never used smokeless tobacco. She reports current alcohol use. She reports that she does not use drugs.  Social History     Tobacco Use    Smoking status: Never    Smokeless tobacco: Never   Substance Use Topics    Alcohol use: Yes     Comment: 1 PER WEEK       Allergies     Allergies   Allergen Reactions    Pravastatin Nausea    Simvastatin Muscle Pain (Myalgia)       Prior to Admission Medications      Medications Prior to Admission   Medication Sig Dispense Refill Last Dose    acetaminophen (TYLENOL) 325 MG tablet Take 650 mg by mouth daily as needed for mild pain   prn at prn    allopurinol (ZYLOPRIM) 300 MG tablet Take 300 mg by mouth daily   6/23/2024 at am    Biotin 10 MG CAPS Take 1 capsule by mouth daily   6/17/2024 at am    cetirizine (ZYRTEC) 10 MG tablet Take 20 mg by mouth daily   6/23/2024 at am    diphenoxylate-atropine (LOMOTIL) 2.5-0.025 MG tablet Take 1 tablet by mouth 2 times daily as needed for diarrhea   prn at prn    ESTRADIOL-NORETHINDRONE ACET PO Take 1 tablet by mouth daily 0.5-0.1 mg tab   6/23/2024 at am    ezetimibe (ZETIA) 10 MG tablet Take 10 mg by mouth At Bedtime    6/23/2024 at pm    famotidine (PEPCID) 20 MG tablet Take 20 mg by mouth daily   6/23/2024  at am    Ferrous Gluconate 240 (27 Fe) MG TABS Take 480 mg by mouth daily   Past Week at am    Fish Oil-Krill Oil (KRILL OIL PLUS PO) Take 1-2 capsules by mouth daily   6/16/2024 at am    fluocinonide (LIDEX) 0.05 % external solution Apply topically 2 times daily as needed   prn at prn    gabapentin (NEURONTIN) 300 MG capsule Take 900 mg by mouth at bedtime   6/23/2024 at pm    hydroCHLOROthiazide 12.5 MG tablet Take 12.5 mg by mouth daily   6/23/2024 at am    hydrocortisone (WESTCORT) 0.2 % external cream Apply topically 2 times daily as needed   prn at orn    levothyroxine (SYNTHROID,LEVOTHROID) 100 MCG tablet Take 100 mcg by mouth daily   6/24/2024 at am    lisinopril (ZESTRIL) 10 MG tablet Take 10 mg by mouth daily   6/23/2024 at an    Magnesium Oxide -Mg Supplement 500 MG CAPS Take 1 capsule by mouth daily   6/16/2024 at am    meloxicam (MOBIC) 7.5 MG tablet TAKE ONE TABLET BY MOUTH TWICE DAILY WITH FOOD. STOP ALL OTHER NSAIDS (ADVIL, ALEEVE, ASPIRIN)*   6/16/2024 at pm    multivitamin w/minerals (MULTI-VITAMIN) tablet Take 1 tablet by mouth daily   6/16/2024 at am    saccharomyces boulardii (FLORASTOR) 250 MG capsule Take 250 mg by mouth daily   6/16/2024 at am    semaglutide (OZEMPIC, 0.25 OR 0.5 MG/DOSE,) 2 MG/1.5ML SOPN pen Inject 0.5 mg Subcutaneous every 7 days   6/10/2024 at am       Review of Systems   A 12 point comprehensive review of systems was negative except as noted above.    OBJECTIVE         Physical Exam   Temp:  [97.4  F (36.3  C)-97.9  F (36.6  C)] 97.4  F (36.3  C)  Pulse:  [65-83] 75  Resp:  [15-38] 17  BP: (156-177)/(60-78) 163/70  SpO2:  [95 %-100 %] 99 %  Body mass index is 21.8 kg/m .    GENERAL:  Alert, appears comfortable, in no acute distress, appears stated age   HEAD:  Normocephalic, without obvious abnormality, atraumatic   EYES:  PERRL, conjunctiva clear,  EOM's intact   NOSE: Nares normal,  mucosa normal, no drainage   THROAT: Lips, mucosa,  gums normal, mouth moist   NECK:  Supple, symmetrical, trachea midline   BACK:   Symmetric, no curvature, ROM normal   LUNGS:   Clear to auscultation bilaterally, no rales, rhonchi, or wheezing, symmetric chest rise on inhalation, respirations unlabored   CHEST WALL:  No tenderness or deformity   HEART:  Regular rate and rhythm, S1 and S2 normal, no murmur, rub, or gallop    ABDOMEN:   Soft, non-tender, bowel sounds active , no masses, no organomegaly, no rebound or guarding   EXTREMITIES: Status post right shoulder arthroplasty, right arm sling in place   SKIN: No exanthems in the visualized areas   NEURO: Alert, oriented x 3   PSYCH: Cooperative, behavior is appropriate      Imaging Reviewed Personally By Myself    Right shoulder xray  Reverse total shoulder arthroplasty. Negative for postoperative purposes.     Labs Reviewed Personally By Myself   Sodium 134, potassium 4.7, creatinine 1.15, glucose 141  Hemoglobin A1c 7  WBC 9.4, hemoglobin 10.9, platelet 291    Preoperative Labs Reviewed Personally By Myself     Thank you for this consultation.  Appreciate the opportunity to participate in the care of Sabrina Burrell, please feel free to contact us for any questions or concerns.    Teresa Farris MD  [facility]  Phone: #[phone]

## 2024-06-24 NOTE — ANESTHESIA PROCEDURE NOTES
Airway         Procedure Start/Stop Times: 6/24/2024 9:36 AM  Staff -        Anesthesiologist:  Estephania Becker MD       CRNA: Carter Eldridge APRN CRNA       Performed By: CRNA  Consent for Airway        Urgency: elective  Indications and Patient Condition       Indications for airway management: mary ann-procedural       Induction type:intravenous       Mask difficulty assessment: 1 - vent by mask    Final Airway Details       Final airway type: endotracheal airway       Successful airway: ETT - single  Endotracheal Airway Details        ETT size (mm): 7.0       Cuffed: yes       Successful intubation technique: direct laryngoscopy       DL Blade Type: MAC 1       Grade View of Cords: 1       Adjucts: stylet       Position: Left       Measured from: gums/teeth       Secured at (cm): 22       Bite block used: Soft    Post intubation assessment        Placement verified by: capnometry, equal breath sounds and chest rise        Number of attempts at approach: 1       Number of other approaches attempted: 0       Secured with: tape       Ease of procedure: easy       Dentition: Intact       Dental guard used and removed. Dental Guard Type: Standard White.    Medication(s) Administered   Medication Administration Time: 6/24/2024 9:36 AM

## 2024-06-24 NOTE — ANESTHESIA PROCEDURE NOTES
Interscalene Procedure Note    Pre-Procedure   Staff -        Anesthesiologist:  Estephania Becker MD       Performed By: anesthesiologist       Location: pre-op       Procedure Start/Stop Times: 6/24/2024 8:53 AM and 6/24/2024 8:54 AM       Pre-Anesthestic Checklist: patient identified, IV checked, site marked, risks and benefits discussed, informed consent, monitors and equipment checked, pre-op evaluation, at physician/surgeon's request and post-op pain management  Timeout:       Correct Patient: Yes        Correct Procedure: Yes        Correct Site: Yes        Correct Position: Yes        Correct Laterality: Yes        Site Marked: Yes  Procedure Documentation  Procedure: Interscalene       Laterality: right       Patient Position: supine       Patient Prep/Sterile Barriers: sterile gloves, mask       Skin prep: Chloraprep       Needle Gauge: 20.        Needle Length (Inches): 4        Ultrasound guided       1. Ultrasound was used to identify targeted nerve, plexus, vascular marker, or fascial plane and place a needle adjacent to it in real-time.       2. Ultrasound was used to visualize the spread of anesthetic in close proximity to the above referenced structure.       3. A permanent image is entered into the patient's record.       4. The visualized anatomic structures appeared normal.       5. There were no apparent abnormal pathologic findings.    Assessment/Narrative         The placement was negative for: blood aspirated, painful injection and site bleeding       Paresthesias: No.       Bolus given via needle..        Secured via.        Insertion/Infusion Method: Single Shot       Complications: none       Injection made incrementally with aspirations every 5 mL.    Medication(s) Administered   Bupivacaine 0.5% PF (Infiltration) - Infiltration   8 mL - 6/24/2024 8:53:00 AM  Bupivacaine liposome (Exparel) 1.3% LA inj susp (Infiltration) - Infiltration   10 mL - 6/24/2024 8:53:00 AM  Medication  "Administration Time: 6/24/2024 8:53 AM      FOR [facility] ([facility]) ONLY:   Pain Team Contact information: please page the Pain Team Via [employer]. Search \"Pain\". During daytime hours, please page the attending first. At night please page the resident first.      "

## 2024-06-24 NOTE — ANESTHESIA PROCEDURE NOTES
Cervical Plexus (Superficial Cervical Plexus) Procedure Note    Pre-Procedure   Staff -        Anesthesiologist:  Estephania Becker MD       Performed By: anesthesiologist       Location: pre-op       Procedure Start/Stop Times: 6/24/2024 8:51 AM and 6/24/2024 8:52 AM       Pre-Anesthestic Checklist: patient identified, IV checked, site marked, risks and benefits discussed, informed consent, monitors and equipment checked, pre-op evaluation, at physician/surgeon's request and post-op pain management  Timeout:       Correct Patient: Yes        Correct Procedure: Yes        Correct Site: Yes        Correct Position: Yes        Correct Laterality: Yes        Site Marked: Yes  Procedure Documentation  Procedure: Cervical Plexus (Superficial Cervical Plexus)       Laterality: right       Patient Position: sitting       Patient Prep/Sterile Barriers: sterile gloves, mask       Skin prep: Chloraprep       Needle Gauge: 20.        Needle Length (Inches): 4        Ultrasound guided       1. Ultrasound was used to identify targeted nerve, plexus, vascular marker, or fascial plane and place a needle adjacent to it in real-time.       2. Ultrasound was used to visualize the spread of anesthetic in close proximity to the above referenced structure.       3. A permanent image is entered into the patient's record.       4. The visualized anatomic structures appeared normal.       5. There were no apparent abnormal pathologic findings.    Assessment/Narrative         The placement was negative for: blood aspirated, painful injection and site bleeding       Paresthesias: No.       Bolus given via needle..        Secured via.        Insertion/Infusion Method: Single Shot       Complications: none       Injection made incrementally with aspirations every 5 mL.    Medication(s) Administered   Bupivacaine 0.5% PF (Infiltration) - Infiltration   4 mL - 6/24/2024 8:51:00 AM  Medication Administration Time: 6/24/2024 8:51 AM      FOR [facility]  "([place] [facility]) ONLY:   Pain Team Contact information: please page the Pain Team Via [employer]. Search \"Pain\". During daytime hours, please page the attending first. At night please page the resident first.      "

## 2024-06-24 NOTE — H&P
No interval changes to pre op H&P. Patient is medically fit to proceed with surgery. Informed consent was obtained. Risks, benefits, alternatives to surgery reviewed.     SHAWNA DEL VALLE MD

## 2024-06-24 NOTE — OP NOTE
Operative Note    Name:  Sabrina Burrell  :  1940  MRN:  [id number]  Procedure Date:  2024    Preoperative Diagnosis:  Right Shoulder DJD and Rotator cuff tear    Postoperative Diagnosis:  Same    Procedures:  1.Right Reverse Total Shoulder Arthroplasty    Surgeon(s):   Anam Burrell MD    Asst.   Lyndsey Washburn PA-C PA-C assistance was required due to the complexity of the procedure, for patient positioning, instrumentation assistance, soft tissue retraction and patient safety.      Circulator: Lb Hugo RN  Relief Circulator: Gabby Chirinos RN  Scrub Person: Gerri Mccabe    Anesthesia:   General and Interscalene block with Exparel     Estimated Blood Loss:  100 mL    Findings: Massive rotator cuff tear, right shoulder    Condition on discharge from OR:  stable    Drains: none     Specimens: None    Tissue Removed, Not Sent: Humeral head    Complications: none     Disposition:  Stable and awake to postanesthesia recovery..    INDICATION FOR OPERATION:  Sabrina Burrell is a 83 year old female with a history of shoulder pain and stiffness and she has failed nonsurgical treatment. CT arthrogram demonstrated massive, irrepairable rotator cuff tear. Given her persistent pain and dysfunction, she wished to undergo shoulder arthroplasty. Risks and benefits of the planned procedure as well as details of surgery were discussed with the patient. Consent was obtained.     REPORT OF OPERATION:   The patient was brought to operating room and placed supine on the operating table. An interscalene block was placed by Anesthesia preoperatively and she was given appropriate preoperative antibiotic. After induction of general anesthesia, she was placed in the beach-chair position on the operating room table. All bony prominences were well padded. The shoulder was prepped and draped in normal sterile fashion.    A standard anterior deltopectoral incision was utilized. Deep retractors were placed below  the clavipectoral fascia and the deltoid. The humeral head was inspected. The rotator cuff was noted to have a massive rotator cuff tear.   Biceps had already ruptured. Residual subscapularis and capsule was released off the lesser tuberosity and tagged.    The humeral head was exposed and noted to a central wear pattern. Humeral head cut was then performed using appropriate instrumentation. The humerus was then reamed and broached, and a trial stem with a head protection plate was placed.   The glenoid was then exposed. The labrum was excised circumferentially.  Residual cartilage was scraped away. Next, the glenoid was drilled and reamed and prepared for glenoid implant. The glenoid baseplate was then impacted and the peripheral screws were placed. A trial glenosphere was placed.   The humeral component was trialed, and then the appropriate glenosphere implant was then placed.  The humerus was again exposed and the humeral implant was impacted into place.  The humeral tray and polyethelene was then again trialed.  Next a standard humeral polyethelene cup and tray was impacted into place.    A final reduction was performed, and the shoulder was copiously irrigated with saline irrigation. All instruments were removed. There was no subscapularis to repair. The incison was again copiously irrigated with saline irrigation and 1 gram of vancomycin powder was placed in the shoulder joint and subdeltoid space.  The incision was closed in layers with #1 Ethibond closure of the deltopectoral split, 0 Vicryl and 2-0 Vicryl subcutaneous closure, and running 3-0 monocryl suture and skin glue. A sterile dressing was placed on the shoulder.     All sponge and needle counts were correct at the conclusion of the surgery.        Post Op Plan:  clementina HASTINGS  24 hrs post op ancef  PT/OT  DVT: ASA 81 daily x 2 weeks, SCDs, lower extremity weight bearing    SHAWNA FELA DEL VALLE MD   Date: 6/24/2024  Time: 11:11 AM    Implants:  Implant  Name Type Inv. Item Serial No.  Lot No. LRB No. Used Action   BASEPLATE LATERAL RVRS 25MM OFFS +3MM [id number] - [id number] Total Joint Component/Insert BASEPLATE LATERAL RVRS 25MM OFFS +3MM [id number] [facility] NA Right 1 Implanted   Cannulated CoCr Standard Glenosphere CoCr + [id number] Total Joint Component/Insert  [id number] byyd [facility]  Right 1 Implanted   SCREW CENTRAL 6.5X30MM [id number] - [id number] Metallic Hardware/[employer]artlett SCREW CENTRAL 6.5X30MM [id number]  [employer] NA Right 1 Implanted   SCREW PERIPHERAL 5.0X30MM [id number] - [id number] Metallic Hardware/Bartlett SCREW PERIPHERAL 5.0X30MM [id number]  [employer] NA Right 1 Implanted   SCREW PERIPHERAL 5.0X30MM [id number] - [id number] Metallic Hardware/Bartlett SCREW PERIPHERAL 5.0X30MM [id number]  [employer] NA Right 1 Implanted   Humeral Stem, PLUS, SHORT [id number] + Ti 2+ Total Joint Component/Insert  [id number]   Right 1 Implanted   Reversed Insert, Thickness, +3mm UHWPE + [id number] Total Joint Component/Insert  [id number] byyd [facility]  Right 1 Implanted

## 2024-06-25 ENCOUNTER — APPOINTMENT (OUTPATIENT)
Dept: OCCUPATIONAL THERAPY | Facility: CLINIC | Age: 84
End: 2024-06-25
Attending: STUDENT IN AN ORGANIZED HEALTH CARE EDUCATION/TRAINING PROGRAM
Payer: COMMERCIAL

## 2024-06-25 ENCOUNTER — APPOINTMENT (OUTPATIENT)
Dept: PHYSICAL THERAPY | Facility: CLINIC | Age: 84
End: 2024-06-25
Attending: PHYSICIAN ASSISTANT
Payer: COMMERCIAL

## 2024-06-25 VITALS
DIASTOLIC BLOOD PRESSURE: 68 MMHG | BODY MASS INDEX: 21.68 KG/M2 | OXYGEN SATURATION: 97 % | HEIGHT: 64 IN | HEART RATE: 59 BPM | RESPIRATION RATE: 16 BRPM | TEMPERATURE: 97.5 F | SYSTOLIC BLOOD PRESSURE: 106 MMHG | WEIGHT: 127 LBS

## 2024-06-25 LAB
ANION GAP SERPL CALCULATED.3IONS-SCNC: 9 MMOL/L (ref 7–15)
BUN SERPL-MCNC: 20.3 MG/DL (ref 8–23)
CALCIUM SERPL-MCNC: 9 MG/DL (ref 8.8–10.2)
CHLORIDE SERPL-SCNC: 97 MMOL/L (ref 98–107)
CREAT SERPL-MCNC: 1 MG/DL (ref 0.51–0.95)
DEPRECATED HCO3 PLAS-SCNC: 27 MMOL/L (ref 22–29)
EGFRCR SERPLBLD CKD-EPI 2021: 56 ML/MIN/1.73M2
ERYTHROCYTE [DISTWIDTH] IN BLOOD BY AUTOMATED COUNT: 13.1 % (ref 10–15)
GLUCOSE BLDC GLUCOMTR-MCNC: 153 MG/DL (ref 70–99)
GLUCOSE BLDC GLUCOMTR-MCNC: 197 MG/DL (ref 70–99)
GLUCOSE SERPL-MCNC: 173 MG/DL (ref 70–99)
HCT VFR BLD AUTO: 29.7 % (ref 35–47)
HGB BLD-MCNC: 9.9 G/DL (ref 11.7–15.7)
MCH RBC QN AUTO: 32.1 PG (ref 26.5–33)
MCHC RBC AUTO-ENTMCNC: 33.3 G/DL (ref 31.5–36.5)
MCV RBC AUTO: 96 FL (ref 78–100)
PLATELET # BLD AUTO: 253 10E3/UL (ref 150–450)
POTASSIUM SERPL-SCNC: 4.5 MMOL/L (ref 3.4–5.3)
RBC # BLD AUTO: 3.08 10E6/UL (ref 3.8–5.2)
SODIUM SERPL-SCNC: 133 MMOL/L (ref 135–145)
WBC # BLD AUTO: 18.5 10E3/UL (ref 4–11)

## 2024-06-25 PROCEDURE — 85027 COMPLETE CBC AUTOMATED: CPT | Performed by: FAMILY MEDICINE

## 2024-06-25 PROCEDURE — 36415 COLL VENOUS BLD VENIPUNCTURE: CPT | Performed by: FAMILY MEDICINE

## 2024-06-25 PROCEDURE — 250N000013 HC RX MED GY IP 250 OP 250 PS 637: Performed by: FAMILY MEDICINE

## 2024-06-25 PROCEDURE — 97110 THERAPEUTIC EXERCISES: CPT | Mod: GO

## 2024-06-25 PROCEDURE — 82962 GLUCOSE BLOOD TEST: CPT

## 2024-06-25 PROCEDURE — 97116 GAIT TRAINING THERAPY: CPT | Mod: GP

## 2024-06-25 PROCEDURE — 97535 SELF CARE MNGMENT TRAINING: CPT | Mod: GO

## 2024-06-25 PROCEDURE — 99215 OFFICE O/P EST HI 40 MIN: CPT | Performed by: FAMILY MEDICINE

## 2024-06-25 PROCEDURE — 97110 THERAPEUTIC EXERCISES: CPT | Mod: GP

## 2024-06-25 PROCEDURE — 250N000013 HC RX MED GY IP 250 OP 250 PS 637: Performed by: PHYSICIAN ASSISTANT

## 2024-06-25 PROCEDURE — 80048 BASIC METABOLIC PNL TOTAL CA: CPT | Performed by: FAMILY MEDICINE

## 2024-06-25 PROCEDURE — 97166 OT EVAL MOD COMPLEX 45 MIN: CPT | Mod: GO

## 2024-06-25 PROCEDURE — 97161 PT EVAL LOW COMPLEX 20 MIN: CPT | Mod: GP

## 2024-06-25 RX ORDER — MELOXICAM 7.5 MG/1
TABLET ORAL
COMMUNITY
Start: 2024-06-25

## 2024-06-25 RX ORDER — METOPROLOL SUCCINATE 25 MG/1
25 TABLET, EXTENDED RELEASE ORAL DAILY
Qty: 30 TABLET | Refills: 3 | Status: SHIPPED | OUTPATIENT
Start: 2024-06-25

## 2024-06-25 RX ADMIN — INSULIN ASPART 1 UNITS: 100 INJECTION, SOLUTION INTRAVENOUS; SUBCUTANEOUS at 06:47

## 2024-06-25 RX ADMIN — ALLOPURINOL 300 MG: 300 TABLET ORAL at 09:20

## 2024-06-25 RX ADMIN — INSULIN ASPART 2 UNITS: 100 INJECTION, SOLUTION INTRAVENOUS; SUBCUTANEOUS at 11:47

## 2024-06-25 RX ADMIN — POLYETHYLENE GLYCOL 3350 17 G: 17 POWDER, FOR SOLUTION ORAL at 09:33

## 2024-06-25 RX ADMIN — Medication 400 MG: at 09:20

## 2024-06-25 RX ADMIN — LEVOTHYROXINE SODIUM 100 MCG: 0.05 TABLET ORAL at 09:20

## 2024-06-25 RX ADMIN — SENNOSIDES AND DOCUSATE SODIUM 1 TABLET: 50; 8.6 TABLET ORAL at 09:21

## 2024-06-25 RX ADMIN — ACETAMINOPHEN 975 MG: 325 TABLET ORAL at 00:15

## 2024-06-25 RX ADMIN — CETIRIZINE HYDROCHLORIDE 20 MG: 10 TABLET, FILM COATED ORAL at 09:20

## 2024-06-25 RX ADMIN — FAMOTIDINE 20 MG: 20 TABLET, FILM COATED ORAL at 09:20

## 2024-06-25 RX ADMIN — ASPIRIN 81 MG: 81 TABLET, COATED ORAL at 09:21

## 2024-06-25 RX ADMIN — ACETAMINOPHEN 975 MG: 325 TABLET ORAL at 09:20

## 2024-06-25 ASSESSMENT — ACTIVITIES OF DAILY LIVING (ADL)
ADLS_ACUITY_SCORE: 25
ADLS_ACUITY_SCORE: 25
ADLS_ACUITY_SCORE: 26
ADLS_ACUITY_SCORE: 25
ADLS_ACUITY_SCORE: 26
ADLS_ACUITY_SCORE: 25
ADLS_ACUITY_SCORE: 25
ADLS_ACUITY_SCORE: 26
ADLS_ACUITY_SCORE: 25

## 2024-06-25 NOTE — DISCHARGE SUMMARY
"ORTHOPEDIC DISCHARGE SUMMARY       Sabrina Burrell,  1940, MRN [id number]    Admission Date: 2024      Admission Diagnoses: Osteoarthritis of right shoulder [M19.011]  Rotator cuff tear [M75.100]     Discharge Date:  24     Post-operative Day:  1 Day Post-Op    Reason for Admission: The patient was admitted for the following: Procedure(s):  RIGHT REVERSE TOTAL SHOULDER ARTHROPLASTY    BRIEF HOSPITAL COURSE   Sabrina Burrell is a pleasant 83 year old female who underwent the aforementioned procedure with Dr. Jiménez on . There were no intraoperative complications and the patient was transferred to the recovery room and later the orthopedic unit in stable condition. Once the patient reached the orthopedic floor our orthopedic pain protocol was implemented along with the following:    Anticoagulation Medications: ASA  Therapy: PT and OT  Activity: NWB  Bracing: Slingshot Brace    Consultations during Admission: Hospitalist service for medical management     COMPLICATIONS/SIGNIFICANT FINDINGS    NONE    DISCHARGE INFORMATION   Condition at discharge: Good  Discharge destination: Home  Patient was seen by myself on the date of discharge.    FOLLOW UP CARE   Follow up with orthopedics in 2 weeks or sooner should the need arise. Ortho will continue to manage pain control, post op anticoagulation and incision care.     Follow up with your PCP for management of chronic medical problems and to evaluate for post op medical complications including constipation, nausea/vomiting, DVT/PE, anemia, changes in blood pressure, fevers/chills, urinary retention and atelectasis/pneumonia.     Subjective   Patient is doing well on POD #1. Pain is well controlled with oral medications. Ambulating. Tolerating oral intake.     Physical Exam   /68 (BP Location: Left arm)   Pulse 59   Temp 97.5  F (36.4  C) (Oral)   Resp 16   Ht 1.626 m (5' 4\")   Wt 57.6 kg (127 lb)   SpO2 97%   BMI 21.80 kg/m    The " patient is A&Ox3. Appears comfortable, sitting up at bedside & dressed. Wearing sling appropriately.  Sensation is intact to light touch in right upper extremity, hand & fingers.  right upper extremity motor strength 5/5 in ulnar, median and radial nerve distributions. Fires axillary. Flexes elbow. Flexes & extends wrist. Full composite fist. Opposes thumb.  Palpable right radial pulse. Hand warm with brisk cap refill in fingers.  Calves are soft and non-tender.   Mild edema & ecchymosis of right shoulder. The incision is covered. Dressing C/D/I.     Pertinent Results at Discharge     Hemoglobin   Date/Time Value Ref Range Status   06/25/2024 05:56 AM 9.9 (L) 11.7 - 15.7 g/dL Final     Platelet Count   Date/Time Value Ref Range Status   06/25/2024 05:56  150 - 450 10e3/uL Final       Problem List   Active Problems:    S/p reverse total shoulder arthroplasty      Ml Dexter PA-C/Dr. Jiménez  [facility]  [phone]  Date: 6/25/2024  Time: 12:14 PM

## 2024-06-25 NOTE — PROGRESS NOTES
06/25/24 0733   Appointment Info   Signing Clinician's Name / Credentials (OT) Argenis Olguin, OTR/L   Rehab Comments (OT) OT Evaluation   Living Environment   People in Home alone  (daughter plans to stay 2 weeks for recovery)   Current Living Arrangements house   Home Accessibility stairs to enter home   Number of Stairs, Main Entrance 4   Stair Railings, Main Entrance railing on left side (ascending)  (16 stairs to go downstairs for laundry/shower)   Living Environment Comments Able to stay on one level. Laundry and shower in basement   Self-Care   Usual Activity Tolerance good   Current Activity Tolerance good   Equipment Currently Used at Home   (RTS, vanity on right side, tub/shower upstairs w/grab bars-but doesn't feel safe)   Activity/Exercise/Self-Care Comment Pt IND w/ ADLs and IADLs at baseline-does have a cleaning lady who comes every other week.   General Information   Onset of Illness/Injury or Date of Surgery 06/24/24   Referring Physician Anam Jiménez MD   Patient/Family Therapy Goal Statement (OT) to go home   Existing Precautions/Restrictions shoulder;weight bearing  (AROM elbow/hand/wrist and codmans)   Right Upper Extremity (Weight-bearing Status) non weight-bearing (NWB)   Cognitive Status Examination   Orientation Status orientation to person, place and time   Affect/Mental Status (Cognitive) WNL   Pain Assessment   Patient Currently in Pain No   Range of Motion Comprehensive   Comment, General Range of Motion no ROM of surgical shoulder post op   Coordination   Upper Extremity Coordination   (Increased difficulty managing clasps on sling w/L hand only)   Bed Mobility   Comment (Bed Mobility) Mod A   Transfers   Transfers bed-chair transfer;toilet transfer;shower transfer   Transfer Skill: Bed to Chair/Chair to Bed   Bed-Chair [place] (Transfers) minimum assist (75% patient effort)   Shower Transfer   Shower Transfer Comments Min A   Toilet Transfer   Toilet Transfer Comments Min a    Balance   Balance Assessment sitting dynamic balance   Balance Comments SBA   Activities of Daily Living   BADL Assessment/Intervention bathing;upper body dressing;lower body dressing   Bathing Assessment/Intervention   [place] Level (Bathing) minimum assist (75% patient effort)   Upper Body Dressing Assessment/Training   [place] Level (Upper Body Dressing) maximum assist (25% patient effort)   Lower Body Dressing Assessment/Training   [place] Level (Lower Body Dressing) moderate assist (50% patient effort)   Clinical Impression   Criteria for Skilled Therapeutic Interventions Met (OT) Yes, treatment indicated   OT Diagnosis decreased ADLs   Influenced by the following impairments TSA   OT Problem List-Impairments impacting ADL activity tolerance impaired;range of motion (ROM);sensation;post-surgical precautions   Assessment of Occupational Performance 3-5 Performance Deficits   Identified Performance Deficits dressing, bathing, transfers   Planned Therapy Interventions (OT) ADL retraining;ROM;home program guidelines;progressive activity/exercise   Clinical Decision Making Complexity (OT) detailed assessment/moderate complexity   Risk & Benefits of therapy have been explained evaluation/treatment results reviewed;patient   OT Total Evaluation Time   OT Eval, Moderate Complexity Minutes (49359) 10   OT Goals   Therapy Frequency (OT) Daily   OT Predicted Duration/Target Date for Goal Attainment 06/29/24   OT Goals Upper Body Dressing;Bed Mobility;Toilet Transfer/Toileting   OT: Upper Body Dressing Minimal assist   OT: Bed Mobility Supervision/stand-by assist   OT: Toilet Transfer/Toileting Supervision/stand-by assist;Goal Met   Interventions   Interventions Quick Adds Self-Care/Home Management;Therapeutic Procedures/Exercise   Self-Care/Home Management   Self-Care/Home Mgmt/ADL, Compensatory, Meal Prep Minutes (00151) 40   Symptoms Noted During/After Treatment (Meal Preparation/Planning Training)  fatigue   Treatment Detail/Skilled Intervention - Pt edu on shoulder px - pt verbalized understanding  and needing 2-3 cues during session. Edu handout given for compensatory strategies for UB dressing. Pt instructed on bed mobility techniques - completed Mod I. STS w/CGA and vc for hand placement for chair and commode transfer. Min A for pull up pants on R hip. Completed shower transfer with lateral side step-increased cues/CGA. Pt edu on safety technique for stairs using railing on L side and side stepping up stairs -Pt edu on FB dressing including donning/doffing immobilizer, tank top and button down shirt - needing Mod A . Pt noted to be more fatigued at this time, brought pt cane due to reaching for surfaces for balance and small steps. Ed pt on proper bed tech w/pillows vc and CGA for bed mob. Pt noted to start falling asleep at that time, ended session with pt in bed.   Therapeutic Procedures/Exercise   Therapeutic Procedure: strength, endurance, ROM, flexibillity minutes (57566) 8   Symptoms Noted During/After Treatment fatigue  (R hand continues to feel numb)   Treatment Detail/Skilled Intervention Pt given edu handout on pendulum/UE exercises. Pt instructed on and completed pendelum tech of bending at waist to facilitate with dressing, did not progress pendulumn excerises due fatigue. Completed x3 AAROM elbow flex/ext. Completed 5 reps AROM supination/pronation, wrist flex/ext, ulnar/radial deviation, and fist pumps. Pt IND w/ HEP after initial cueing. Pt completed exercises using RUE due to numbness in LUE. Instructed to start exercises at home once sensation returns to LUE. Instructed to complete exercises 2x per day at home.   OT Discharge Planning   OT Plan Practice sling/UB dressing with daughter, review bed/car/shower-pt was becoming very drowsy at end of session may not recall info.   OT Discharge Recommendation (DC Rec)   (Defer to ortho)   OT Rationale for DC Rec Pt is very motivated to work with  therapy and learn ADL independence and daughter plans to stay with pt for 2 weeks. Pt would benefit from additional OT sesssion to practice ADLs with daughter. Requested PT order due to pt needing cane during OT session and taking small steps with walking feeling mildly off balance.   OT Brief overview of current status CGA w/transfers, Mod/max A for UB dressing,   OT Equipment Needed at Discharge   (may need shower chair)   Total Session Time   Timed Code Treatment Minutes 48   Total Session Time (sum of timed and untimed services) 58

## 2024-06-25 NOTE — PROGRESS NOTES
[facility]  OUTPATIENT OCCUPATIONAL THERAPY  EVALUATION  PLAN OF TREATMENT FOR OUTPATIENT REHABILITATION  (COMPLETE FOR INITIAL CLAIMS ONLY)  Patient's Last Name, First Name, M.I.  YOB: 1940  Sabrina Burrell                          Provider's Name  [facility] Medical Record No.  [id number]                             Onset Date:  06/24/24   Start of Care Date:      Type:     ___PT   _X_OT   ___SLP Medical Diagnosis:  (P) R TSA                    OT Diagnosis:  decreased ADLs Visits from SOC:  1     See note for plan of treatment, functional goals and certification details    I CERTIFY THE NEED FOR THESE SERVICES FURNISHED UNDER        THIS PLAN OF TREATMENT AND WHILE UNDER MY CARE     (Physician co-signature of this document indicates review and certification of the therapy plan).

## 2024-06-25 NOTE — PROGRESS NOTES
Patient discharged with daughter to transport to home. VSS on RA. PIV access removed prior to discharge. Belongings remain with pt at discharge. AVS reviewed with pt and daughter, questions answered, education complete.

## 2024-06-25 NOTE — PROGRESS NOTES
[facility] MEDICINE PROGRESS NOTE      Identification/Summary: Sabrina Burrell is a 83 year old female with a past medical history of  essential hypertension, dyslipidemia, DM2, hypothyroidism, CKD 3, GERD, underwent right total shoulder arthroplasty.  Right shoulder pain is stable.  Will be discharging home today.    Assessment and Plan:  Essential hypertension  Blood pressure is stable  Metoprolol XL 25 mg daily  Follow-up with primary care physician closely  Patient is not taking lisinopril or hydrochlorothiazide at home    Mild hyponatremia at 133    Hypothyroidism  Levothyroxine 100 mcg daily    DM2  Ozempic 0.5 mg once a week  Diabetic diet and insulin sliding scale  Hemoglobin A1c 7     CKD 3, creatinine 1.15-->1     Dyslipidemia  Zetia 10 mg daily    Chronic anemia  Hemoglobin 9.9 from 10.9    Status post right shoulder arthroplasty  Resume routine postoperative care  Physical and Occupational Therapy  Nonweightbearing in right upper extremity  Use incentive spirometry frequently  DVT prophylaxis per orthopedics, aspirin 81 mg twice a day  Pain control with Tylenol 975 mg every 8 hours, 650 mg every 4 hours as needed, oxycodone 5 to 10 mg every 4 hours as needed    Clinically Significant Risk Factors Present on Admission                  # Hypertension: Home medication list includes antihypertensive(s)    # Anemia: based on hgb <11                        Teresa Farris MD  [facility]  [facility]  Phone: #[phone]  Securely message with the Vocera Web Console (learn more here)  Text page via [employer] Paging/Directory     Interval History/Subjective:  Resting comfortably.  Right shoulder pain is stable.  Denies headache, chest pain, breathing difficulty, palpitation, nausea, vomiting, abdominal pain or urinary symptoms.    Physical Exam/Objective:  Temp:  [97.4  F (36.3  C)-97.8  F (36.6  C)] 97.6  F (36.4  C)  Pulse:  [65-83] 70  Resp:   [14-38] 16  BP: (132-177)/(60-76) 132/61  SpO2:  [94 %-100 %] 94 %  Body mass index is 21.8 kg/m .    GENERAL:  Alert, appears comfortable, in no acute distress, appears stated age   HEAD:  Normocephalic, without obvious abnormality, atraumatic   EYES:  PERRL, conjunctiva clear,  EOM's intact   NOSE: Nares normal,  mucosa normal, no drainage   THROAT: Lips, mucosa,  gums normal, mouth moist   NECK: Supple, symmetrical, trachea midline   BACK:   Symmetric, no curvature, ROM normal   LUNGS:   Clear to auscultation bilaterally, no rales, rhonchi, or wheezing, symmetric chest rise on inhalation, respirations unlabored   CHEST WALL:  No tenderness or deformity   HEART:  Regular rate and rhythm, S1 and S2 normal, no murmur    ABDOMEN:   Soft, non-tender, bowel sounds active , no masses, no organomegaly, no rebound or guarding   EXTREMITIES: Status post right shoulder arthroplasty, right arm sling in place   SKIN: No exanthems in the visualized areas   NEURO: Alert, oriented x 3   PSYCH: Cooperative, behavior is appropriate         Data reviewed today: I personally reviewed all new medications, labs, imaging/diagnostics reports over the past 24 hours. Pertinent findings include:    Imaging:   Right shoulder xray  Reverse total shoulder arthroplasty. Negative for postoperative purposes.     Labs:  Most Recent 3 CBC's:  Recent Labs   Lab Test 06/25/24  0556   WBC 18.5*   HGB 9.9*   MCV 96        Most Recent 3 BMP's:  Recent Labs   Lab Test 06/25/24  0646 06/25/24  0556 06/24/24  2138 06/24/24  0744 07/23/19  0559 07/22/19 2028   NA  --  133*  --   --  137  --    POTASSIUM  --  4.5  --   --  4.7  --    CHLORIDE  --  97*  --   --  106  --    CO2  --  27  --   --  27  --    BUN  --  20.3  --   --  26  --    CR  --  1.00*  --   --  1.18* 1.09*   ANIONGAP  --  9  --   --  4  --    KIMBERLY  --  9.0  --   --  8.3*  --    * 173* 216*   < > 165*  --     < > = values in this interval not displayed.       Medications:  "  Personally Reviewed.  Medications   Current Facility-Administered Medications   Medication Dose Route Frequency Provider Last Rate Last Admin    BUPivacaine liposome (EXPAREL) LA inj was given in the infiltration site to produce post-op analgesia. Duration of action is up to 72 hours. Other \"ernie\" meds should not be given for 96 hours except for lidocaine 4% patch. This is for INFORMATION ONLY.   Does not apply Continuous PRN Estephania Becker MD        lactated ringers infusion   Intravenous Continuous Lyndsey Washburn PA-C 75 mL/hr at 06/24/24 2132 New Bag at 06/24/24 2132     Current Facility-Administered Medications   Medication Dose Route Frequency Provider Last Rate Last Admin    acetaminophen (TYLENOL) tablet 975 mg  975 mg Oral Q8H Lyndsey Washburn PA-C   975 mg at 06/25/24 0015    allopurinol (ZYLOPRIM) tablet 300 mg  300 mg Oral Daily Teresa Farris MD   300 mg at 06/24/24 1417    aspirin EC tablet 81 mg  81 mg Oral BID Lyndsey Washburn PA-C   81 mg at 06/24/24 2133    cetirizine (zyrTEC) tablet 20 mg  20 mg Oral Daily Teresa Farris MD   20 mg at 06/24/24 1417    ezetimibe (ZETIA) tablet 10 mg  10 mg Oral At Bedtime Teresa Farris MD   10 mg at 06/24/24 2136    famotidine (PEPCID) tablet 20 mg  20 mg Oral Daily Teresa Farris MD   20 mg at 06/24/24 1417    gabapentin (NEURONTIN) capsule 900 mg  900 mg Oral At Bedtime Teresa Farris MD   900 mg at 06/24/24 2132    insulin aspart (NovoLOG) injection (RAPID ACTING)  1-7 Units Subcutaneous TID AC Teresa Farris MD   1 Units at 06/25/24 0647    levothyroxine (SYNTHROID/LEVOTHROID) tablet 100 mcg  100 mcg Oral Daily Teresa Farris MD        magnesium oxide (MAG-OX) tablet 400 mg  400 mg Oral Daily Teresa Farris MD   400 mg at 06/24/24 1417    metoprolol succinate ER (TOPROL XL) 24 hr tablet 25 mg  25 mg Oral Daily Teresa Farris MD   25 mg at 06/24/24 6482    polyethylene glycol (MIRALAX) Packet 17 g  17 g Oral Daily Lyndsey Washburn PA-C        " senna-docusate (SENOKOT-S/PERICOLACE) 8.6-50 MG per tablet 1 tablet  1 tablet Oral BID Lyndsey Washbunr PA-C   1 tablet at 06/24/24 2133    sodium chloride (PF) 0.9% PF flush 3 mL  3 mL Intracatheter Q8H Lyndsey Washburn PA-C   3 mL at 06/24/24 2131      Total time spent 40 min

## 2024-06-25 NOTE — PROGRESS NOTES
"Orthopedic Progress Note      Assessment: 1 Day Post-Op  S/P Procedure(s):  RIGHT REVERSE TOTAL SHOULDER ARTHROPLASTY @    Plan:   - Continue OT.  - Weightbearing status: NWB right Upper Extremity  - Continue sling & abduction pillow  - Anticoagulation: Aspirin 81 mg bid x30 days, in addition to SCDs, lisseth stockings and early ambulation.  - Discharge planning: Discharge to home today pending PT eval.    Subjective:  Pain: Left shoulder pain well controlled on Tylenol and aspirin.  Nausea, Vomiting:  No  Chest pain: No  Lightheadedness, Dizziness:  No  Neuro: Block in effect    Patient is doing well on POD #1. Tolerating oral intake, pain well controlled on oral medications, voiding & ambulating. Ready for discharge. Hgb 9.9. (pre-op 10.9).    Objective:  /61 (BP Location: Left arm)   Pulse 70   Temp 97.6  F (36.4  C) (Oral)   Resp 16   Ht 1.626 m (5' 4\")   Wt 57.6 kg (127 lb)   SpO2 94%   BMI 21.80 kg/m    The patient is A&Ox3. Appears comfortable, sitting up at bedside & dressed. Wearing sling appropriately.  Sensation is intact to light touch in right upper extremity, hand & fingers.  right upper extremity motor strength 5/5 in ulnar, median and radial nerve distributions. Fires axillary. Flexes elbow. Flexes & extends wrist. Full composite fist. Opposes thumb.  Palpable right radial pulse. Hand warm with brisk cap refill in fingers.  Calves are soft and non-tender.   Mild edema & ecchymosis of right shoulder. The incision is covered. Dressing C/D/I.        Pertinent Labs   Lab Results: personally reviewed.   No results found for: \"INR\", \"PROTIME\"  Lab Results   Component Value Date    WBC 18.5 (H) 06/25/2024    HGB 9.9 (L) 06/25/2024    HCT 29.7 (L) 06/25/2024    MCV 96 06/25/2024     06/25/2024     Lab Results   Component Value Date     (L) 06/25/2024    CO2 27 06/25/2024         Report completed by:  Ml Dexter PA-C/Dr. [facility]    Date: 6/25/2024  Time: " 8:55 AM

## 2024-06-25 NOTE — PROGRESS NOTES
06/25/24 1045   Appointment Info   Signing Clinician's Name / Credentials (PT) Dustin Garcia, PT   Quick Adds   Quick Adds Certification   Living Environment   People in Home alone   Current Living Arrangements house   Home Accessibility stairs to enter home;stairs within home   Number of Stairs, Main Entrance 4   Stair Railings, Main Entrance railing on left side (ascending)   Number of Stairs, Within Home, Primary greater than 10 stairs   Stair Railings, Within Home, Primary railings on both sides of stairs   Living Environment Comments Able to stay on one level. Laundry and shower in basement   Self-Care   Usual Activity Tolerance good   Current Activity Tolerance good   Equipment Currently Used at Home none   Fall history within last six months no   Activity/Exercise/Self-Care Comment Indep with all I ADL's and ADL's   General Information   Onset of Illness/Injury or Date of Surgery 06/24/24   Cognition   Affect/Mental Status (Cognition) WNL   Range of Motion (ROM)   Range of Motion ROM is WFL   Strength (Manual Muscle Testing)   Strength (Manual Muscle Testing) No deficits observed during functional mobility   Transfers   Transfers sit-stand transfer   Sit-Stand Transfer   Sit-Stand Saline (Transfers) contact guard;verbal cues   Assistive Device (Sit-Stand Transfers) walker, front-wheeled   Gait/Stairs (Locomotion)   Saline Level (Gait) contact guard;verbal cues   Assistive Device (Gait) walker, front-wheeled   Distance in Feet (Gait) 20   Pattern (Gait) swing-through   Deviations/Abnormal Patterns (Gait) other (see comments)  (path deviations)   Balance   Balance Comments noted slight path deviations to mostly R, but noted several times drifting to L. No LOB   Clinical Impression   Criteria for Skilled Therapeutic Intervention Yes, treatment indicated   PT Diagnosis (PT) Impaired functional mobility   Influenced by the following impairments balance, weakness, pain   Functional limitations due  to impairments transfers, gait   Clinical Presentation (PT Evaluation Complexity) stable   Clinical Presentation Rationale Patient presents as medically diagnosed   Clinical Decision Making (Complexity) low complexity   Planned Therapy Interventions (PT) home exercise program;gait training;stair training;transfer training;patient/family education   Risk & Benefits of therapy have been explained patient;daughter   PT Total Evaluation Time   PT Eval, Low Complexity Minutes (40174) 10   Therapy Certification   Start of care date 06/25/24   Certification date from 06/25/24   Certification date to 07/25/24   Physical Therapy Goals   PT Frequency One time eval and treatment only   PT Predicted Duration/Target Date for Goal Attainment 06/25/24   PT Goals Transfers;Gait;Stairs;PT Goal 1   PT: Transfers Modified independent;Assistive device;Sit to/from stand;Within precautions;Goal Met   PT: Gait Modified independent;Within precautions;Straight cane;Greater than 200 feet;Goal Met   PT: Stairs 8 stairs;Rail on left;Rail on right;Within precautions;Supervision/stand-by assist;Goal Met   PT: Goal 1 Indep with HEP, goal met   Interventions   Interventions Quick Adds Gait Training;Therapeutic Procedure   Therapeutic Procedure/Exercise   Ther. Procedure: strength, endurance, ROM, flexibillity Minutes (73047) 11   Symptoms Noted During/After Treatment none   Treatment Detail/Skilled Intervention Standing HEP with emphasis on improving balance and LE strengthening.  vc for technique, posture, safety/progression   Gait Training   Gait Training Minutes (66172) 14   Symptoms Noted During/After Treatment (Gait Training) none   Treatment Detail/Skilled Intervention stable  gait but noted to be slightly slow and slight path deviations to  mostly R side but at times would veer to L, no loss of balance and patient able to self correct. Noted by daughter she was doing this prior to surgery but seemed to be slightly worse.  Patient instructed  in safety, use of canes, tried 2 different types of cane to find model she preferred.  Patient safe for dc to home with daughter present.    Encouraged to continue with standing HEP to work standing HEP   Distance in Feet 120,200, 150, 140   Vance Level (Gait Training) independent   Physical Assistance Level (Gait Training) verbal cues   Weight Bearing (Gait Training) full weight-bearing   Assistive Device (Gait Training) straight cane   Pattern Analysis (Gait Training) swing-through gait   Gait Analysis Deviations decreased rashad;decreased step length;other (see comments)  (path deviations)   Impairments (Gait Analysis/Training) balance impaired   PT Discharge Planning   PT Plan DC PT   PT Discharge Recommendation (DC Rec) home with assist   PT Rationale for DC Rec Patient safe for dc to home with daughter, gait stable but does have slight path deviations which was present prior to surgery. Given HEP to work on balance and instructed to do safely at this time   PT Brief overview of current status Patient independentl with gait/transfers   PT Equipment Needed at Discharge walker, rolling   Total Session Time   Timed Code Treatment Minutes 25   Total Session Time (sum of timed and untimed services) 35   [facility]  OUTPATIENT PHYSICAL THERAPY EVALUATION  PLAN OF TREATMENT FOR OUTPATIENT REHABILITATION  (COMPLETE FOR INITIAL CLAIMS ONLY)  Patient's Last Name, First Name, M.I.  YOB: 1940  Sabrina Burrell                        Provider's Name  [facility] Medical Record No.  [id number]                             Onset Date:  06/24/24   Start of Care Date:  06/25/24   Type:     _X_PT   ___OT   ___SLP Medical Diagnosis:                 PT Diagnosis:  Impaired functional mobility Visits from SOC:  1     See note for plan of treatment, functional goals and certification details    I CERTIFY THE NEED FOR THESE SERVICES FURNISHED UNDER         THIS PLAN OF TREATMENT AND WHILE UNDER MY CARE     (Physician co-signature of this document indicates review and certification of the therapy plan).
